# Patient Record
Sex: FEMALE | Race: WHITE | NOT HISPANIC OR LATINO | Employment: UNEMPLOYED | URBAN - METROPOLITAN AREA
[De-identification: names, ages, dates, MRNs, and addresses within clinical notes are randomized per-mention and may not be internally consistent; named-entity substitution may affect disease eponyms.]

---

## 2017-01-11 ENCOUNTER — COMMUNICATION - HEALTHEAST (OUTPATIENT)
Dept: FAMILY MEDICINE | Facility: CLINIC | Age: 37
End: 2017-01-11

## 2017-01-11 DIAGNOSIS — F41.9 ANXIETY: ICD-10-CM

## 2017-01-16 ENCOUNTER — COMMUNICATION - HEALTHEAST (OUTPATIENT)
Dept: TELEHEALTH | Facility: CLINIC | Age: 37
End: 2017-01-16

## 2017-01-27 ENCOUNTER — OFFICE VISIT - HEALTHEAST (OUTPATIENT)
Dept: FAMILY MEDICINE | Facility: CLINIC | Age: 37
End: 2017-01-27

## 2017-01-27 DIAGNOSIS — Z01.818 PREOP EXAMINATION: ICD-10-CM

## 2017-01-27 ASSESSMENT — MIFFLIN-ST. JEOR: SCORE: 1447.44

## 2017-02-01 ENCOUNTER — COMMUNICATION - HEALTHEAST (OUTPATIENT)
Dept: FAMILY MEDICINE | Facility: CLINIC | Age: 37
End: 2017-02-01

## 2017-02-01 DIAGNOSIS — F33.41 RECURRENT MAJOR DEPRESSIVE DISORDER, IN PARTIAL REMISSION (H): ICD-10-CM

## 2017-02-06 ENCOUNTER — RECORDS - HEALTHEAST (OUTPATIENT)
Dept: ADMINISTRATIVE | Facility: OTHER | Age: 37
End: 2017-02-06

## 2017-03-07 ENCOUNTER — COMMUNICATION - HEALTHEAST (OUTPATIENT)
Dept: FAMILY MEDICINE | Facility: CLINIC | Age: 37
End: 2017-03-07

## 2017-03-07 DIAGNOSIS — F41.9 ANXIETY: ICD-10-CM

## 2017-03-16 ENCOUNTER — COMMUNICATION - HEALTHEAST (OUTPATIENT)
Dept: FAMILY MEDICINE | Facility: CLINIC | Age: 37
End: 2017-03-16

## 2017-03-16 DIAGNOSIS — F41.9 ANXIETY AND DEPRESSION: ICD-10-CM

## 2017-03-16 DIAGNOSIS — F32.A ANXIETY AND DEPRESSION: ICD-10-CM

## 2017-05-04 ENCOUNTER — COMMUNICATION - HEALTHEAST (OUTPATIENT)
Dept: FAMILY MEDICINE | Facility: CLINIC | Age: 37
End: 2017-05-04

## 2017-05-04 DIAGNOSIS — F41.9 ANXIETY: ICD-10-CM

## 2017-06-29 ENCOUNTER — COMMUNICATION - HEALTHEAST (OUTPATIENT)
Dept: FAMILY MEDICINE | Facility: CLINIC | Age: 37
End: 2017-06-29

## 2017-06-29 DIAGNOSIS — F41.9 ANXIETY: ICD-10-CM

## 2017-07-03 ENCOUNTER — AMBULATORY - HEALTHEAST (OUTPATIENT)
Dept: FAMILY MEDICINE | Facility: CLINIC | Age: 37
End: 2017-07-03

## 2017-07-03 DIAGNOSIS — F41.9 ANXIETY: ICD-10-CM

## 2017-09-18 ENCOUNTER — COMMUNICATION - HEALTHEAST (OUTPATIENT)
Dept: FAMILY MEDICINE | Facility: CLINIC | Age: 37
End: 2017-09-18

## 2017-09-18 DIAGNOSIS — Z30.41 ORAL CONTRACEPTIVE USE: ICD-10-CM

## 2017-09-19 ENCOUNTER — COMMUNICATION - HEALTHEAST (OUTPATIENT)
Dept: TELEHEALTH | Facility: CLINIC | Age: 37
End: 2017-09-19

## 2017-10-17 ENCOUNTER — OFFICE VISIT - HEALTHEAST (OUTPATIENT)
Dept: FAMILY MEDICINE | Facility: CLINIC | Age: 37
End: 2017-10-17

## 2017-10-17 DIAGNOSIS — F41.9 ANXIETY: ICD-10-CM

## 2017-10-17 DIAGNOSIS — Z00.00 HEALTHCARE MAINTENANCE: ICD-10-CM

## 2017-10-17 DIAGNOSIS — R61 SWEATING INCREASE: ICD-10-CM

## 2017-11-02 ENCOUNTER — COMMUNICATION - HEALTHEAST (OUTPATIENT)
Dept: FAMILY MEDICINE | Facility: CLINIC | Age: 37
End: 2017-11-02

## 2017-11-02 DIAGNOSIS — F33.41 RECURRENT MAJOR DEPRESSIVE DISORDER, IN PARTIAL REMISSION (H): ICD-10-CM

## 2017-11-13 ENCOUNTER — OFFICE VISIT - HEALTHEAST (OUTPATIENT)
Dept: FAMILY MEDICINE | Facility: CLINIC | Age: 37
End: 2017-11-13

## 2017-11-13 DIAGNOSIS — J20.9 ACUTE BRONCHITIS: ICD-10-CM

## 2017-11-13 DIAGNOSIS — J45.901 ACUTE ASTHMA EXACERBATION: ICD-10-CM

## 2017-12-13 ENCOUNTER — COMMUNICATION - HEALTHEAST (OUTPATIENT)
Dept: FAMILY MEDICINE | Facility: CLINIC | Age: 37
End: 2017-12-13

## 2018-01-03 ENCOUNTER — OFFICE VISIT - HEALTHEAST (OUTPATIENT)
Dept: FAMILY MEDICINE | Facility: CLINIC | Age: 38
End: 2018-01-03

## 2018-01-03 ENCOUNTER — AMBULATORY - HEALTHEAST (OUTPATIENT)
Dept: NURSING | Facility: CLINIC | Age: 38
End: 2018-01-03

## 2018-01-03 DIAGNOSIS — R07.9 CHEST PAIN ON EXERTION: ICD-10-CM

## 2018-01-03 DIAGNOSIS — R07.9 CHEST PAIN: ICD-10-CM

## 2018-01-03 DIAGNOSIS — R09.1 PLEURISY: ICD-10-CM

## 2018-01-03 LAB
ATRIAL RATE - MUSE: 88 BPM
DIASTOLIC BLOOD PRESSURE - MUSE: NORMAL MMHG
INTERPRETATION ECG - MUSE: NORMAL
P AXIS - MUSE: 24 DEGREES
PR INTERVAL - MUSE: 134 MS
QRS DURATION - MUSE: 76 MS
QT - MUSE: 400 MS
QTC - MUSE: 484 MS
R AXIS - MUSE: 40 DEGREES
SYSTOLIC BLOOD PRESSURE - MUSE: NORMAL MMHG
T AXIS - MUSE: 35 DEGREES
VENTRICULAR RATE- MUSE: 88 BPM

## 2018-01-03 ASSESSMENT — MIFFLIN-ST. JEOR: SCORE: 1468.93

## 2018-01-09 ENCOUNTER — COMMUNICATION - HEALTHEAST (OUTPATIENT)
Dept: FAMILY MEDICINE | Facility: CLINIC | Age: 38
End: 2018-01-09

## 2018-01-09 DIAGNOSIS — Z30.41 ORAL CONTRACEPTIVE USE: ICD-10-CM

## 2018-01-15 ENCOUNTER — COMMUNICATION - HEALTHEAST (OUTPATIENT)
Dept: FAMILY MEDICINE | Facility: CLINIC | Age: 38
End: 2018-01-15

## 2018-01-15 DIAGNOSIS — F41.9 ANXIETY: ICD-10-CM

## 2018-01-18 ENCOUNTER — COMMUNICATION - HEALTHEAST (OUTPATIENT)
Dept: FAMILY MEDICINE | Facility: CLINIC | Age: 38
End: 2018-01-18

## 2018-01-18 DIAGNOSIS — F41.9 ANXIETY: ICD-10-CM

## 2018-01-30 ENCOUNTER — OFFICE VISIT - HEALTHEAST (OUTPATIENT)
Dept: FAMILY MEDICINE | Facility: CLINIC | Age: 38
End: 2018-01-30

## 2018-01-30 DIAGNOSIS — R22.1 LUMP IN NECK: ICD-10-CM

## 2018-01-30 DIAGNOSIS — R94.31 PROLONGED Q-T INTERVAL ON ECG: ICD-10-CM

## 2018-01-30 LAB
ANION GAP SERPL CALCULATED.3IONS-SCNC: 11 MMOL/L (ref 5–18)
BASOPHILS # BLD AUTO: 0.1 THOU/UL (ref 0–0.2)
BASOPHILS NFR BLD AUTO: 1 % (ref 0–2)
BUN SERPL-MCNC: 11 MG/DL (ref 8–22)
CALCIUM SERPL-MCNC: 9.6 MG/DL (ref 8.5–10.5)
CHLORIDE BLD-SCNC: 106 MMOL/L (ref 98–107)
CO2 SERPL-SCNC: 22 MMOL/L (ref 22–31)
CREAT SERPL-MCNC: 0.76 MG/DL (ref 0.6–1.1)
EOSINOPHIL # BLD AUTO: 0.3 THOU/UL (ref 0–0.4)
EOSINOPHIL NFR BLD AUTO: 3 % (ref 0–6)
ERYTHROCYTE [DISTWIDTH] IN BLOOD BY AUTOMATED COUNT: 11.4 % (ref 11–14.5)
GFR SERPL CREATININE-BSD FRML MDRD: >60 ML/MIN/1.73M2
GLUCOSE BLD-MCNC: 92 MG/DL (ref 70–125)
HCT VFR BLD AUTO: 42.7 % (ref 35–47)
HGB BLD-MCNC: 14.2 G/DL (ref 12–16)
LYMPHOCYTES # BLD AUTO: 1.9 THOU/UL (ref 0.8–4.4)
LYMPHOCYTES NFR BLD AUTO: 21 % (ref 20–40)
MCH RBC QN AUTO: 31.3 PG (ref 27–34)
MCHC RBC AUTO-ENTMCNC: 33.3 G/DL (ref 32–36)
MCV RBC AUTO: 94 FL (ref 80–100)
MONOCYTES # BLD AUTO: 0.5 THOU/UL (ref 0–0.9)
MONOCYTES NFR BLD AUTO: 5 % (ref 2–10)
NEUTROPHILS # BLD AUTO: 6.2 THOU/UL (ref 2–7.7)
NEUTROPHILS NFR BLD AUTO: 70 % (ref 50–70)
PLATELET # BLD AUTO: 320 THOU/UL (ref 140–440)
PMV BLD AUTO: 7.2 FL (ref 7–10)
POTASSIUM BLD-SCNC: 4.2 MMOL/L (ref 3.5–5)
RBC # BLD AUTO: 4.54 MILL/UL (ref 3.8–5.4)
SODIUM SERPL-SCNC: 139 MMOL/L (ref 136–145)
TSH SERPL DL<=0.005 MIU/L-ACNC: 1.43 UIU/ML (ref 0.3–5)
WBC: 8.9 THOU/UL (ref 4–11)

## 2018-02-02 ENCOUNTER — COMMUNICATION - HEALTHEAST (OUTPATIENT)
Dept: FAMILY MEDICINE | Facility: CLINIC | Age: 38
End: 2018-02-02

## 2018-02-02 ENCOUNTER — OFFICE VISIT - HEALTHEAST (OUTPATIENT)
Dept: CARDIOLOGY | Facility: CLINIC | Age: 38
End: 2018-02-02

## 2018-02-02 DIAGNOSIS — R94.31 ABNORMAL QT INTERVAL PRESENT ON ELECTROCARDIOGRAM: ICD-10-CM

## 2018-02-02 DIAGNOSIS — R94.31 PROLONGED Q-T INTERVAL ON ECG: ICD-10-CM

## 2018-02-02 LAB
ATRIAL RATE - MUSE: 85 BPM
DIASTOLIC BLOOD PRESSURE - MUSE: NORMAL MMHG
INTERPRETATION ECG - MUSE: NORMAL
P AXIS - MUSE: 16 DEGREES
PR INTERVAL - MUSE: 130 MS
QRS DURATION - MUSE: 82 MS
QT - MUSE: 384 MS
QTC - MUSE: 456 MS
R AXIS - MUSE: 34 DEGREES
SYSTOLIC BLOOD PRESSURE - MUSE: NORMAL MMHG
T AXIS - MUSE: 29 DEGREES
VENTRICULAR RATE- MUSE: 85 BPM

## 2018-02-02 ASSESSMENT — MIFFLIN-ST. JEOR: SCORE: 1484.4

## 2018-02-28 ENCOUNTER — COMMUNICATION - HEALTHEAST (OUTPATIENT)
Dept: FAMILY MEDICINE | Facility: CLINIC | Age: 38
End: 2018-02-28

## 2018-03-06 ENCOUNTER — COMMUNICATION - HEALTHEAST (OUTPATIENT)
Dept: FAMILY MEDICINE | Facility: CLINIC | Age: 38
End: 2018-03-06

## 2018-03-06 DIAGNOSIS — F41.9 ANXIETY: ICD-10-CM

## 2018-04-18 ENCOUNTER — OFFICE VISIT - HEALTHEAST (OUTPATIENT)
Dept: FAMILY MEDICINE | Facility: CLINIC | Age: 38
End: 2018-04-18

## 2018-04-18 DIAGNOSIS — N93.9 EXCESSIVE VAGINAL BLEEDING: ICD-10-CM

## 2018-05-11 ENCOUNTER — HOSPITAL ENCOUNTER (OUTPATIENT)
Dept: ULTRASOUND IMAGING | Facility: HOSPITAL | Age: 38
Discharge: HOME | End: 2018-05-11
Attending: FAMILY MEDICINE

## 2018-05-11 DIAGNOSIS — N93.9 EXCESSIVE VAGINAL BLEEDING: ICD-10-CM

## 2018-05-14 ENCOUNTER — COMMUNICATION - HEALTHEAST (OUTPATIENT)
Dept: FAMILY MEDICINE | Facility: CLINIC | Age: 38
End: 2018-05-14

## 2018-05-14 DIAGNOSIS — N92.0 HEAVY MENSES: ICD-10-CM

## 2018-05-30 ENCOUNTER — COMMUNICATION - HEALTHEAST (OUTPATIENT)
Dept: FAMILY MEDICINE | Facility: CLINIC | Age: 38
End: 2018-05-30

## 2018-05-30 DIAGNOSIS — F41.9 ANXIETY: ICD-10-CM

## 2018-06-01 ENCOUNTER — COMMUNICATION - HEALTHEAST (OUTPATIENT)
Dept: FAMILY MEDICINE | Facility: CLINIC | Age: 38
End: 2018-06-01

## 2018-06-01 DIAGNOSIS — F32.A ANXIETY AND DEPRESSION: ICD-10-CM

## 2018-06-01 DIAGNOSIS — F41.9 ANXIETY AND DEPRESSION: ICD-10-CM

## 2018-06-08 ENCOUNTER — RECORDS - HEALTHEAST (OUTPATIENT)
Dept: ADMINISTRATIVE | Facility: OTHER | Age: 38
End: 2018-06-08

## 2018-06-30 ENCOUNTER — COMMUNICATION - HEALTHEAST (OUTPATIENT)
Dept: FAMILY MEDICINE | Facility: CLINIC | Age: 38
End: 2018-06-30

## 2018-06-30 DIAGNOSIS — F43.23 ADJUSTMENT DISORDER WITH MIXED ANXIETY AND DEPRESSED MOOD: ICD-10-CM

## 2018-08-13 ENCOUNTER — AMBULATORY - HEALTHEAST (OUTPATIENT)
Dept: FAMILY MEDICINE | Facility: CLINIC | Age: 38
End: 2018-08-13

## 2018-08-13 ENCOUNTER — AMBULATORY - HEALTHEAST (OUTPATIENT)
Dept: NURSING | Facility: CLINIC | Age: 38
End: 2018-08-13

## 2018-08-13 DIAGNOSIS — J02.9 PHARYNGITIS: ICD-10-CM

## 2018-08-13 LAB — DEPRECATED S PYO AG THROAT QL EIA: NORMAL

## 2018-08-14 LAB — GROUP A STREP BY PCR: NORMAL

## 2018-08-25 ENCOUNTER — COMMUNICATION - HEALTHEAST (OUTPATIENT)
Dept: FAMILY MEDICINE | Facility: CLINIC | Age: 38
End: 2018-08-25

## 2018-08-25 DIAGNOSIS — F41.9 ANXIETY: ICD-10-CM

## 2018-09-06 ENCOUNTER — COMMUNICATION - HEALTHEAST (OUTPATIENT)
Dept: TELEHEALTH | Facility: CLINIC | Age: 38
End: 2018-09-06

## 2018-09-06 ENCOUNTER — COMMUNICATION - HEALTHEAST (OUTPATIENT)
Dept: HEALTH INFORMATION MANAGEMENT | Facility: CLINIC | Age: 38
End: 2018-09-06

## 2018-11-23 ENCOUNTER — COMMUNICATION - HEALTHEAST (OUTPATIENT)
Dept: FAMILY MEDICINE | Facility: CLINIC | Age: 38
End: 2018-11-23

## 2018-11-23 DIAGNOSIS — F41.9 ANXIETY: ICD-10-CM

## 2019-01-09 ENCOUNTER — COMMUNICATION - HEALTHEAST (OUTPATIENT)
Dept: FAMILY MEDICINE | Facility: CLINIC | Age: 39
End: 2019-01-09

## 2019-01-09 DIAGNOSIS — Z30.41 ORAL CONTRACEPTIVE USE: ICD-10-CM

## 2019-01-25 ENCOUNTER — OFFICE VISIT - HEALTHEAST (OUTPATIENT)
Dept: FAMILY MEDICINE | Facility: CLINIC | Age: 39
End: 2019-01-25

## 2019-01-25 DIAGNOSIS — J01.90 ACUTE SINUSITIS WITH SYMPTOMS GREATER THAN 10 DAYS: ICD-10-CM

## 2019-02-11 ENCOUNTER — AMBULATORY - HEALTHEAST (OUTPATIENT)
Dept: FAMILY MEDICINE | Facility: CLINIC | Age: 39
End: 2019-02-11

## 2019-02-11 ENCOUNTER — COMMUNICATION - HEALTHEAST (OUTPATIENT)
Dept: FAMILY MEDICINE | Facility: CLINIC | Age: 39
End: 2019-02-11

## 2019-02-11 DIAGNOSIS — R94.31 PROLONGED Q-T INTERVAL ON ECG: ICD-10-CM

## 2019-02-20 ENCOUNTER — COMMUNICATION - HEALTHEAST (OUTPATIENT)
Dept: FAMILY MEDICINE | Facility: CLINIC | Age: 39
End: 2019-02-20

## 2019-02-23 ENCOUNTER — COMMUNICATION - HEALTHEAST (OUTPATIENT)
Dept: FAMILY MEDICINE | Facility: CLINIC | Age: 39
End: 2019-02-23

## 2019-02-23 DIAGNOSIS — F41.9 ANXIETY: ICD-10-CM

## 2019-03-11 ENCOUNTER — OFFICE VISIT - HEALTHEAST (OUTPATIENT)
Dept: FAMILY MEDICINE | Facility: CLINIC | Age: 39
End: 2019-03-11

## 2019-03-11 DIAGNOSIS — R03.0 ELEVATED BP WITHOUT DIAGNOSIS OF HYPERTENSION: ICD-10-CM

## 2019-03-11 DIAGNOSIS — N64.4 PAIN OF LEFT BREAST: ICD-10-CM

## 2019-03-11 DIAGNOSIS — F41.9 ANXIETY: ICD-10-CM

## 2019-03-11 DIAGNOSIS — J45.20 MILD INTERMITTENT ASTHMA WITHOUT COMPLICATION: ICD-10-CM

## 2019-03-11 DIAGNOSIS — Z13.220 SCREENING FOR CHOLESTEROL LEVEL: ICD-10-CM

## 2019-03-11 DIAGNOSIS — N64.59 INVERTED NIPPLE: ICD-10-CM

## 2019-03-11 DIAGNOSIS — R94.31 PROLONGED Q-T INTERVAL ON ECG: ICD-10-CM

## 2019-03-11 DIAGNOSIS — Z30.41 ORAL CONTRACEPTIVE USE: ICD-10-CM

## 2019-03-11 DIAGNOSIS — Z79.899 CONTROLLED SUBSTANCE AGREEMENT SIGNED: ICD-10-CM

## 2019-03-11 DIAGNOSIS — F33.0 MILD RECURRENT MAJOR DEPRESSION (H): ICD-10-CM

## 2019-03-11 LAB
AMPHETAMINES UR QL SCN: NORMAL
BARBITURATES UR QL: NORMAL
BENZODIAZ UR QL: NORMAL
CANNABINOIDS UR QL SCN: NORMAL
COCAINE UR QL: NORMAL
CREAT UR-MCNC: 72.2 MG/DL
METHADONE UR QL SCN: NORMAL
OPIATES UR QL SCN: NORMAL
OXYCODONE UR QL: NORMAL
PCP UR QL SCN: NORMAL

## 2019-03-29 ENCOUNTER — HOSPITAL ENCOUNTER (OUTPATIENT)
Dept: MAMMOGRAPHY | Facility: CLINIC | Age: 39
Discharge: HOME | End: 2019-03-29
Attending: FAMILY MEDICINE

## 2019-03-29 DIAGNOSIS — N64.59 INVERTED NIPPLE: ICD-10-CM

## 2019-03-29 DIAGNOSIS — N64.4 PAIN OF LEFT BREAST: ICD-10-CM

## 2019-05-21 ENCOUNTER — COMMUNICATION - HEALTHEAST (OUTPATIENT)
Dept: FAMILY MEDICINE | Facility: CLINIC | Age: 39
End: 2019-05-21

## 2019-05-21 ENCOUNTER — AMBULATORY - HEALTHEAST (OUTPATIENT)
Dept: LAB | Facility: CLINIC | Age: 39
End: 2019-05-21

## 2019-05-21 DIAGNOSIS — Z13.220 SCREENING FOR CHOLESTEROL LEVEL: ICD-10-CM

## 2019-05-21 DIAGNOSIS — F41.9 ANXIETY: ICD-10-CM

## 2019-05-21 DIAGNOSIS — Z30.41 ORAL CONTRACEPTIVE USE: ICD-10-CM

## 2019-05-21 DIAGNOSIS — R03.0 ELEVATED BP WITHOUT DIAGNOSIS OF HYPERTENSION: ICD-10-CM

## 2019-05-21 DIAGNOSIS — R94.31 PROLONGED Q-T INTERVAL ON ECG: ICD-10-CM

## 2019-05-21 LAB
ALBUMIN SERPL-MCNC: 3.9 G/DL (ref 3.5–5)
ALP SERPL-CCNC: 21 U/L (ref 45–120)
ALT SERPL W P-5'-P-CCNC: 15 U/L (ref 0–45)
ANION GAP SERPL CALCULATED.3IONS-SCNC: 10 MMOL/L (ref 5–18)
AST SERPL W P-5'-P-CCNC: 13 U/L (ref 0–40)
BILIRUB SERPL-MCNC: 0.4 MG/DL (ref 0–1)
BUN SERPL-MCNC: 11 MG/DL (ref 8–22)
CALCIUM SERPL-MCNC: 9.8 MG/DL (ref 8.5–10.5)
CHLORIDE BLD-SCNC: 107 MMOL/L (ref 98–107)
CHOLEST SERPL-MCNC: 184 MG/DL
CO2 SERPL-SCNC: 23 MMOL/L (ref 22–31)
CREAT SERPL-MCNC: 0.82 MG/DL (ref 0.6–1.1)
FASTING STATUS PATIENT QL REPORTED: YES
GFR SERPL CREATININE-BSD FRML MDRD: >60 ML/MIN/1.73M2
GLUCOSE BLD-MCNC: 86 MG/DL (ref 70–125)
HDLC SERPL-MCNC: 58 MG/DL
LDLC SERPL CALC-MCNC: 106 MG/DL
MAGNESIUM SERPL-MCNC: 2.1 MG/DL (ref 1.8–2.6)
POTASSIUM BLD-SCNC: 4.4 MMOL/L (ref 3.5–5)
PROT SERPL-MCNC: 7 G/DL (ref 6–8)
SODIUM SERPL-SCNC: 140 MMOL/L (ref 136–145)
TRIGL SERPL-MCNC: 101 MG/DL
TSH SERPL DL<=0.005 MIU/L-ACNC: 1.44 UIU/ML (ref 0.3–5)

## 2019-05-23 ENCOUNTER — COMMUNICATION - HEALTHEAST (OUTPATIENT)
Dept: FAMILY MEDICINE | Facility: CLINIC | Age: 39
End: 2019-05-23

## 2019-06-01 ENCOUNTER — COMMUNICATION - HEALTHEAST (OUTPATIENT)
Dept: FAMILY MEDICINE | Facility: CLINIC | Age: 39
End: 2019-06-01

## 2019-06-01 DIAGNOSIS — F41.9 ANXIETY: ICD-10-CM

## 2019-06-06 ENCOUNTER — COMMUNICATION - HEALTHEAST (OUTPATIENT)
Dept: FAMILY MEDICINE | Facility: CLINIC | Age: 39
End: 2019-06-06

## 2019-06-12 ENCOUNTER — COMMUNICATION - HEALTHEAST (OUTPATIENT)
Dept: FAMILY MEDICINE | Facility: CLINIC | Age: 39
End: 2019-06-12

## 2019-06-13 ENCOUNTER — COMMUNICATION - HEALTHEAST (OUTPATIENT)
Dept: SCHEDULING | Facility: CLINIC | Age: 39
End: 2019-06-13

## 2019-06-13 ENCOUNTER — OFFICE VISIT - HEALTHEAST (OUTPATIENT)
Dept: FAMILY MEDICINE | Facility: CLINIC | Age: 39
End: 2019-06-13

## 2019-06-13 DIAGNOSIS — F41.9 ANXIETY AND DEPRESSION: ICD-10-CM

## 2019-06-13 DIAGNOSIS — F32.A ANXIETY AND DEPRESSION: ICD-10-CM

## 2019-06-13 DIAGNOSIS — F41.9 ANXIETY: ICD-10-CM

## 2019-06-17 ENCOUNTER — COMMUNICATION - HEALTHEAST (OUTPATIENT)
Dept: FAMILY MEDICINE | Facility: CLINIC | Age: 39
End: 2019-06-17

## 2019-06-19 ENCOUNTER — COMMUNICATION - HEALTHEAST (OUTPATIENT)
Dept: FAMILY MEDICINE | Facility: CLINIC | Age: 39
End: 2019-06-19

## 2019-06-21 ENCOUNTER — COMMUNICATION - HEALTHEAST (OUTPATIENT)
Dept: FAMILY MEDICINE | Facility: CLINIC | Age: 39
End: 2019-06-21

## 2019-06-21 ENCOUNTER — OFFICE VISIT - HEALTHEAST (OUTPATIENT)
Dept: FAMILY MEDICINE | Facility: CLINIC | Age: 39
End: 2019-06-21

## 2019-06-21 DIAGNOSIS — F41.9 ANXIETY: ICD-10-CM

## 2019-07-08 ENCOUNTER — COMMUNICATION - HEALTHEAST (OUTPATIENT)
Dept: FAMILY MEDICINE | Facility: CLINIC | Age: 39
End: 2019-07-08

## 2019-07-08 DIAGNOSIS — Z30.41 ORAL CONTRACEPTIVE USE: ICD-10-CM

## 2019-09-02 ENCOUNTER — COMMUNICATION - HEALTHEAST (OUTPATIENT)
Dept: FAMILY MEDICINE | Facility: CLINIC | Age: 39
End: 2019-09-02

## 2019-09-02 DIAGNOSIS — F32.A ANXIETY AND DEPRESSION: ICD-10-CM

## 2019-09-02 DIAGNOSIS — F41.9 ANXIETY AND DEPRESSION: ICD-10-CM

## 2019-09-30 ENCOUNTER — COMMUNICATION - HEALTHEAST (OUTPATIENT)
Dept: FAMILY MEDICINE | Facility: CLINIC | Age: 39
End: 2019-09-30

## 2019-09-30 DIAGNOSIS — Z30.41 ORAL CONTRACEPTIVE USE: ICD-10-CM

## 2019-10-11 ENCOUNTER — COMMUNICATION - HEALTHEAST (OUTPATIENT)
Dept: FAMILY MEDICINE | Facility: CLINIC | Age: 39
End: 2019-10-11

## 2019-10-11 DIAGNOSIS — Z30.41 ORAL CONTRACEPTIVE USE: ICD-10-CM

## 2019-10-28 ENCOUNTER — OFFICE VISIT - HEALTHEAST (OUTPATIENT)
Dept: FAMILY MEDICINE | Facility: CLINIC | Age: 39
End: 2019-10-28

## 2019-10-28 DIAGNOSIS — F41.1 ANXIETY STATE: ICD-10-CM

## 2019-10-28 DIAGNOSIS — F33.9 EPISODE OF RECURRENT MAJOR DEPRESSIVE DISORDER, UNSPECIFIED DEPRESSION EPISODE SEVERITY (H): ICD-10-CM

## 2019-10-28 DIAGNOSIS — Z79.899 CONTROLLED SUBSTANCE AGREEMENT SIGNED: ICD-10-CM

## 2019-10-28 DIAGNOSIS — N94.6 DYSMENORRHEA: ICD-10-CM

## 2019-10-28 DIAGNOSIS — B96.89 BV (BACTERIAL VAGINOSIS): ICD-10-CM

## 2019-10-28 DIAGNOSIS — N89.8 VAGINAL DISCHARGE: ICD-10-CM

## 2019-10-28 DIAGNOSIS — N76.0 BV (BACTERIAL VAGINOSIS): ICD-10-CM

## 2019-10-28 DIAGNOSIS — Z00.01 ENCOUNTER FOR GENERAL ADULT MEDICAL EXAMINATION WITH ABNORMAL FINDINGS: ICD-10-CM

## 2019-10-28 ASSESSMENT — ANXIETY QUESTIONNAIRES
5. BEING SO RESTLESS THAT IT IS HARD TO SIT STILL: SEVERAL DAYS
IF YOU CHECKED OFF ANY PROBLEMS ON THIS QUESTIONNAIRE, HOW DIFFICULT HAVE THESE PROBLEMS MADE IT FOR YOU TO DO YOUR WORK, TAKE CARE OF THINGS AT HOME, OR GET ALONG WITH OTHER PEOPLE: SOMEWHAT DIFFICULT
7. FEELING AFRAID AS IF SOMETHING AWFUL MIGHT HAPPEN: SEVERAL DAYS
6. BECOMING EASILY ANNOYED OR IRRITABLE: SEVERAL DAYS
3. WORRYING TOO MUCH ABOUT DIFFERENT THINGS: SEVERAL DAYS
2. NOT BEING ABLE TO STOP OR CONTROL WORRYING: SEVERAL DAYS
GAD7 TOTAL SCORE: 7
1. FEELING NERVOUS, ANXIOUS, OR ON EDGE: SEVERAL DAYS
4. TROUBLE RELAXING: SEVERAL DAYS

## 2019-10-28 ASSESSMENT — MIFFLIN-ST. JEOR: SCORE: 1464.34

## 2019-10-28 ASSESSMENT — PATIENT HEALTH QUESTIONNAIRE - PHQ9: SUM OF ALL RESPONSES TO PHQ QUESTIONS 1-9: 14

## 2019-10-29 LAB
CLUE CELLS: ABNORMAL
TRICHOMONAS, WET PREP: ABNORMAL
YEAST, WET PREP: ABNORMAL

## 2019-10-30 LAB
C TRACH DNA SPEC QL PROBE+SIG AMP: NEGATIVE
N GONORRHOEA DNA SPEC QL NAA+PROBE: NEGATIVE

## 2019-11-13 ENCOUNTER — RECORDS - HEALTHEAST (OUTPATIENT)
Dept: ADMINISTRATIVE | Facility: OTHER | Age: 39
End: 2019-11-13

## 2019-11-25 ENCOUNTER — COMMUNICATION - HEALTHEAST (OUTPATIENT)
Dept: FAMILY MEDICINE | Facility: CLINIC | Age: 39
End: 2019-11-25

## 2019-12-04 ENCOUNTER — OFFICE VISIT - HEALTHEAST (OUTPATIENT)
Dept: FAMILY MEDICINE | Facility: CLINIC | Age: 39
End: 2019-12-04

## 2019-12-04 DIAGNOSIS — N93.9 ABNORMAL UTERINE BLEEDING: ICD-10-CM

## 2019-12-04 DIAGNOSIS — Z01.818 PREOP GENERAL PHYSICAL EXAM: ICD-10-CM

## 2019-12-04 LAB — HCG UR QL: NEGATIVE

## 2019-12-04 ASSESSMENT — MIFFLIN-ST. JEOR: SCORE: 1454.47

## 2019-12-09 ASSESSMENT — MIFFLIN-ST. JEOR
SCORE: 1453.33
SCORE: 1453.33

## 2019-12-10 ENCOUNTER — ANESTHESIA - HEALTHEAST (OUTPATIENT)
Dept: SURGERY | Facility: AMBULATORY SURGERY CENTER | Age: 39
End: 2019-12-10

## 2019-12-11 ENCOUNTER — SURGERY - HEALTHEAST (OUTPATIENT)
Dept: SURGERY | Facility: AMBULATORY SURGERY CENTER | Age: 39
End: 2019-12-11

## 2019-12-11 ENCOUNTER — HOSPITAL ENCOUNTER (OUTPATIENT)
Dept: SURGERY | Facility: AMBULATORY SURGERY CENTER | Age: 39
Discharge: HOME | End: 2019-12-11
Attending: OBSTETRICS & GYNECOLOGY | Admitting: OBSTETRICS & GYNECOLOGY

## 2019-12-11 LAB
DIPSTICK EXPIRATION DATE - HISTORICAL: NORMAL
DIPSTICK LOT NUMBER - HISTORICAL: NORMAL
POC PREG URINE (HCG) HE - HISTORICAL: NEGATIVE
POC SPECIFIC GRAVITY, URINE - HISTORICAL: NORMAL
POCT KIT EXPIRATION DATE - HISTORICAL: NORMAL
POCT KIT LOT NUMBER HE - HISTORICAL: NORMAL
POCT NEGATIVE CONTROL HE - HISTORICAL: NORMAL
POCT POSITIVE CONTROL HE - HISTORICAL: NORMAL

## 2019-12-11 ASSESSMENT — MIFFLIN-ST. JEOR
SCORE: 1453.33
SCORE: 1453.33

## 2019-12-26 ENCOUNTER — COMMUNICATION - HEALTHEAST (OUTPATIENT)
Dept: FAMILY MEDICINE | Facility: CLINIC | Age: 39
End: 2019-12-26

## 2019-12-26 DIAGNOSIS — F33.9 EPISODE OF RECURRENT MAJOR DEPRESSIVE DISORDER, UNSPECIFIED DEPRESSION EPISODE SEVERITY (H): ICD-10-CM

## 2019-12-26 DIAGNOSIS — F41.1 ANXIETY STATE: ICD-10-CM

## 2020-03-02 ENCOUNTER — OFFICE VISIT - HEALTHEAST (OUTPATIENT)
Dept: FAMILY MEDICINE | Facility: CLINIC | Age: 40
End: 2020-03-02

## 2020-03-02 DIAGNOSIS — J45.21 MILD INTERMITTENT ASTHMA WITH EXACERBATION: ICD-10-CM

## 2020-03-02 DIAGNOSIS — R06.02 SHORTNESS OF BREATH: ICD-10-CM

## 2020-03-02 DIAGNOSIS — J45.901 ACUTE ASTHMA EXACERBATION: ICD-10-CM

## 2020-03-02 DIAGNOSIS — R05.9 COUGH: ICD-10-CM

## 2020-05-19 ENCOUNTER — COMMUNICATION - HEALTHEAST (OUTPATIENT)
Dept: FAMILY MEDICINE | Facility: CLINIC | Age: 40
End: 2020-05-19

## 2020-05-19 DIAGNOSIS — F41.1 ANXIETY STATE: ICD-10-CM

## 2020-05-21 RX ORDER — ATENOLOL 25 MG/1
25 TABLET ORAL DAILY
Qty: 90 TABLET | Refills: 0 | Status: SHIPPED | OUTPATIENT
Start: 2020-05-21 | End: 2021-12-04

## 2020-07-01 ENCOUNTER — OFFICE VISIT - HEALTHEAST (OUTPATIENT)
Dept: FAMILY MEDICINE | Facility: CLINIC | Age: 40
End: 2020-07-01

## 2020-07-01 DIAGNOSIS — Z20.822 SUSPECTED COVID-19 VIRUS INFECTION: ICD-10-CM

## 2020-07-01 ASSESSMENT — PATIENT HEALTH QUESTIONNAIRE - PHQ9: SUM OF ALL RESPONSES TO PHQ QUESTIONS 1-9: 0

## 2020-07-02 ENCOUNTER — AMBULATORY - HEALTHEAST (OUTPATIENT)
Dept: FAMILY MEDICINE | Facility: CLINIC | Age: 40
End: 2020-07-02

## 2020-07-02 DIAGNOSIS — Z20.822 SUSPECTED COVID-19 VIRUS INFECTION: ICD-10-CM

## 2020-07-04 ENCOUNTER — COMMUNICATION - HEALTHEAST (OUTPATIENT)
Dept: FAMILY MEDICINE | Facility: CLINIC | Age: 40
End: 2020-07-04

## 2020-11-06 ENCOUNTER — OFFICE VISIT - HEALTHEAST (OUTPATIENT)
Dept: FAMILY MEDICINE | Facility: CLINIC | Age: 40
End: 2020-11-06

## 2020-11-06 DIAGNOSIS — J06.9 UPPER RESPIRATORY TRACT INFECTION, UNSPECIFIED TYPE: ICD-10-CM

## 2020-11-06 DIAGNOSIS — J02.9 SORE THROAT: ICD-10-CM

## 2020-11-10 ENCOUNTER — OFFICE VISIT - HEALTHEAST (OUTPATIENT)
Dept: FAMILY MEDICINE | Facility: CLINIC | Age: 40
End: 2020-11-10

## 2020-11-10 DIAGNOSIS — J06.9 UPPER RESPIRATORY TRACT INFECTION, UNSPECIFIED TYPE: ICD-10-CM

## 2020-11-10 DIAGNOSIS — J02.9 SORE THROAT: ICD-10-CM

## 2020-11-10 LAB — DEPRECATED S PYO AG THROAT QL EIA: NORMAL

## 2020-11-11 LAB — GROUP A STREP BY PCR: NORMAL

## 2020-11-12 ENCOUNTER — COMMUNICATION - HEALTHEAST (OUTPATIENT)
Dept: SCHEDULING | Facility: CLINIC | Age: 40
End: 2020-11-12

## 2020-12-22 ENCOUNTER — RECORDS - HEALTHEAST (OUTPATIENT)
Dept: ADMINISTRATIVE | Facility: OTHER | Age: 40
End: 2020-12-22

## 2020-12-30 ENCOUNTER — AMBULATORY - HEALTHEAST (OUTPATIENT)
Dept: SURGERY | Facility: HOSPITAL | Age: 40
End: 2020-12-30

## 2020-12-30 DIAGNOSIS — Z11.59 ENCOUNTER FOR SCREENING FOR OTHER VIRAL DISEASES: ICD-10-CM

## 2021-01-14 ENCOUNTER — COMMUNICATION - HEALTHEAST (OUTPATIENT)
Dept: FAMILY MEDICINE | Facility: CLINIC | Age: 41
End: 2021-01-14

## 2021-01-14 DIAGNOSIS — F33.9 EPISODE OF RECURRENT MAJOR DEPRESSIVE DISORDER, UNSPECIFIED DEPRESSION EPISODE SEVERITY (H): ICD-10-CM

## 2021-01-14 DIAGNOSIS — F41.1 ANXIETY STATE: ICD-10-CM

## 2021-01-15 RX ORDER — ESCITALOPRAM OXALATE 10 MG/1
10 TABLET ORAL DAILY
Qty: 90 TABLET | Refills: 1 | Status: SHIPPED | OUTPATIENT
Start: 2021-01-15

## 2021-02-03 ENCOUNTER — OFFICE VISIT - HEALTHEAST (OUTPATIENT)
Dept: FAMILY MEDICINE | Facility: CLINIC | Age: 41
End: 2021-02-03

## 2021-02-03 DIAGNOSIS — J45.20 MILD INTERMITTENT ASTHMA WITHOUT COMPLICATION: ICD-10-CM

## 2021-02-03 DIAGNOSIS — Z01.818 PREOP GENERAL PHYSICAL EXAM: ICD-10-CM

## 2021-02-03 DIAGNOSIS — N92.0 EXCESSIVE OR FREQUENT MENSTRUATION: ICD-10-CM

## 2021-02-03 DIAGNOSIS — F41.9 ANXIETY: ICD-10-CM

## 2021-02-03 LAB
ERYTHROCYTE [DISTWIDTH] IN BLOOD BY AUTOMATED COUNT: 12.2 % (ref 11–14.5)
HCG UR QL: NEGATIVE
HCT VFR BLD AUTO: 43.8 % (ref 35–47)
HGB BLD-MCNC: 14.7 G/DL (ref 12–16)
MCH RBC QN AUTO: 30.6 PG (ref 27–34)
MCHC RBC AUTO-ENTMCNC: 33.6 G/DL (ref 32–36)
MCV RBC AUTO: 91 FL (ref 80–100)
PLATELET # BLD AUTO: 293 THOU/UL (ref 140–440)
PMV BLD AUTO: 9.5 FL (ref 7–10)
RBC # BLD AUTO: 4.81 MILL/UL (ref 3.8–5.4)
WBC: 7 THOU/UL (ref 4–11)

## 2021-02-03 RX ORDER — LORAZEPAM 0.5 MG/1
TABLET ORAL
Qty: 10 TABLET | Refills: 0 | Status: SHIPPED | OUTPATIENT
Start: 2021-02-03 | End: 2022-01-13

## 2021-02-03 ASSESSMENT — PATIENT HEALTH QUESTIONNAIRE - PHQ9: SUM OF ALL RESPONSES TO PHQ QUESTIONS 1-9: 4

## 2021-02-03 ASSESSMENT — MIFFLIN-ST. JEOR: SCORE: 1506.86

## 2021-02-08 ENCOUNTER — AMBULATORY - HEALTHEAST (OUTPATIENT)
Dept: FAMILY MEDICINE | Facility: CLINIC | Age: 41
End: 2021-02-08

## 2021-02-08 DIAGNOSIS — Z01.818 PREOP GENERAL PHYSICAL EXAM: ICD-10-CM

## 2021-02-08 LAB
SARS-COV-2 PCR COMMENT: NORMAL
SARS-COV-2 RNA SPEC QL NAA+PROBE: NEGATIVE
SARS-COV-2 VIRUS SPECIMEN SOURCE: NORMAL

## 2021-02-09 ENCOUNTER — COMMUNICATION - HEALTHEAST (OUTPATIENT)
Dept: SCHEDULING | Facility: CLINIC | Age: 41
End: 2021-02-09

## 2021-02-10 ENCOUNTER — ANESTHESIA - HEALTHEAST (OUTPATIENT)
Dept: SURGERY | Facility: HOSPITAL | Age: 41
End: 2021-02-10

## 2021-02-10 ENCOUNTER — SURGERY - HEALTHEAST (OUTPATIENT)
Dept: SURGERY | Facility: HOSPITAL | Age: 41
End: 2021-02-10

## 2021-03-15 ENCOUNTER — COMMUNICATION - HEALTHEAST (OUTPATIENT)
Dept: FAMILY MEDICINE | Facility: CLINIC | Age: 41
End: 2021-03-15

## 2021-03-15 DIAGNOSIS — F41.9 ANXIETY: ICD-10-CM

## 2021-03-15 RX ORDER — BUSPIRONE HYDROCHLORIDE 15 MG/1
30 TABLET ORAL DAILY
Qty: 180 TABLET | Refills: 1 | Status: SHIPPED | OUTPATIENT
Start: 2021-03-15

## 2021-05-26 ASSESSMENT — PATIENT HEALTH QUESTIONNAIRE - PHQ9: SUM OF ALL RESPONSES TO PHQ QUESTIONS 1-9: 14

## 2021-05-27 ASSESSMENT — PATIENT HEALTH QUESTIONNAIRE - PHQ9
SUM OF ALL RESPONSES TO PHQ QUESTIONS 1-9: 0
SUM OF ALL RESPONSES TO PHQ QUESTIONS 1-9: 4

## 2021-05-28 ENCOUNTER — RECORDS - HEALTHEAST (OUTPATIENT)
Dept: ADMINISTRATIVE | Facility: CLINIC | Age: 41
End: 2021-05-28

## 2021-05-28 ASSESSMENT — ANXIETY QUESTIONNAIRES: GAD7 TOTAL SCORE: 7

## 2021-05-28 ASSESSMENT — ASTHMA QUESTIONNAIRES
ACT_TOTALSCORE: 22
ACT_TOTALSCORE: 25
ACT_TOTALSCORE: 25

## 2021-05-29 NOTE — TELEPHONE ENCOUNTER
Pt. Was just seen in March and had talked about medication.  Currently is running low an a couple meds.  When she asks for them to be refilled it is not giving her the option even though it states can be refilled for the next year.   Prescription buspar and wellbutrion xl   [facility]- [facility] [place]   Please call pt.   Thank you.

## 2021-05-29 NOTE — TELEPHONE ENCOUNTER
Faxed all [facility] paperwork to [facility] at 12:07 pm to [phone].  MELANIA AaronA

## 2021-05-29 NOTE — PROGRESS NOTES
ASSESSMENT & PLAN:  1. Anxiety  Significant flare and anxiety for the last 2 weeks.  She is quite tearful today.  I believe her blood pressure and pulse are elevated due to anxiety.  She feels she cannot function at work due to her level of anxiety.  She has had to leave work yesterday and today.  Work is a major stressor currently.  She denies any suicidal ideation.  We discussed referral to psychiatry and psychology but she is hesitant, does not know how she can make this work with her much stress she already has and how overburdened and over-scheduled she already feels.  After discussion, we have decided on a plan to have her stay out of work for about the next week, through 6/21/2019, to focus on her health.  We will get her in with a counselor within the next week to establish care.  Referred also to psychiatry, although suspect this will take longer to get in.  In the meantime she was given an additional prescription of lorazepam, although this is outside of her controlled substance agreement, her lorazepam agreement was for her stable anxiety and currently having a significant anxiety flare.  She does not have a history of recurrently asking for early or extra prescriptions.  I have instructed her to change her BuSpar dosing from 15 mg once daily to 7.5 mg twice daily.  Increase Wellbutrin to 300 mg extended release daily.  Tentatively have her follow-up here in a week, but if this established with therapy and feeling better could follow-up through my chart.  She can also return to work sooner if she feels she is able.  She will get [place] paperwork to me.   - LORazepam (ATIVAN) 0.5 MG tablet; 1 tab up to twice daily for anxiety  Dispense: 10 tablet; Refill: 0  - Ambulatory referral to Psychology  - Ambulatory referral to Psychiatry  - buPROPion (WELLBUTRIN XL) 300 MG 24 hr tablet; Take 1 tablet (300 mg total) by mouth daily.  Dispense: 90 tablet; Refill: 0      Patient Instructions   Change your buspar dose to  HALF tab TWICE DAILY.    Increase wellbutrin to 2 tabs daily (300mg daily).     We will schedule you with psychology and psychiatry.       Orders Placed This Encounter   Procedures     Ambulatory referral to Psychology     Referral Priority:   Routine     Referral Type:   Behavioral Health     Referral Reason:   Evaluation and Treatment     Requested Specialty:   Psychology     Number of Visits Requested:   1     Ambulatory referral to Psychiatry     Referral Priority:   Routine     Referral Type:   Behavioral Health     Referral Reason:   Evaluation and Treatment     Requested Specialty:   Psychiatry     Number of Visits Requested:   1     Medications Discontinued During This Encounter   Medication Reason     albuterol (PROAIR HFA;PROVENTIL HFA;VENTOLIN HFA) 90 mcg/actuation inhaler      LORazepam (ATIVAN) 0.5 MG tablet Reorder     buPROPion (WELLBUTRIN XL) 150 MG 24 hr tablet Reorder       Return in about 1 week (around 6/20/2019).    CHIEF COMPLAINT:  Chief Complaint   Patient presents with     Medication Management     ativan refill       HISTORY OF PRESENT ILLNESS:  Russel is a 38 y.o. female presenting to the clinic today for anxiety.  Significant flaring anxiety symptoms over the last 2 weeks, the worst she has experienced in 10 years.  Had a falling out with her father 2 weeks ago has not spoken with him since.  The following day her  was in the emergency room with chest pain and there was concern for his heart, which was extremely stressful.  She has been doing the workload of 2 jobs at her current position since April, she constantly feels overwhelmed.  Very busy with kids and home life.  She had to leave work yesterday due to symptoms.  She went to the emergency room, that note reviewed, labs and EKG reviewed.  She was given Ativan and felt improved, vital signs improved.  She has not taken Ativan since it was given yesterday in the emergency room.  She has 2 tablets left at home.  She has been  compliant with her Wellbutrin 150 mg extended release daily and BuSpar 15 mg taking once daily.    REVIEW OF SYSTEMS:   All other systems are negative.    PFSH:  Patient Active Problem List   Diagnosis      Delivery     Allergic Rhinitis     Abnormal Pap Smear Of Cervix     Major Depression, Recurrent     Fatigue     Dysmenorrhea     Asthma     Wheezing (Symptom)     Menorrhagia     Anxiety     Nipple Retraction     Controlled substance agreement signed 3/2019 ativan (anxiety/panic) #10 tabs q 2 months        TOBACCO USE:  Social History     Tobacco Use   Smoking Status Former Smoker   Smokeless Tobacco Never Used   Tobacco Comment    quit in        VITALS:  Vitals:    19 0945 19 0947   BP: (!) 184/110 (!) 183/112   Patient Site: Left Arm Left Arm   Patient Position: Sitting Sitting   Cuff Size: Adult Large Adult Large   Pulse: (!) 113 (!) 113   Resp: 16    Temp: 98.5  F (36.9  C) 98.5  F (36.9  C)   TempSrc: Oral Oral   Weight: 183 lb 12.8 oz (83.4 kg)      Wt Readings from Last 3 Encounters:   19 183 lb 12.8 oz (83.4 kg)   19 180 lb (81.6 kg)   19 187 lb 6.4 oz (85 kg)     Body mass index is 35.9 kg/m .    PHYSICAL EXAM:  GENERAL: Pleasant, well-appearing patient in no acute distress.   HEENT: Pupils equal round. Sclerae and conjunctivae clear. Oropharynx with moist mucous membranes.   NEURO: Alert and oriented. Grossly nonfocal.   PSYCHIATRIC: Presents on time and well groomed. Normal speech and thought content. Full affect. No abnormal movements or behaviors noted.  Denies suicidal ideation.  CV: No lower extremity edema        MEDICATIONS:  Current Outpatient Medications   Medication Sig Dispense Refill     buPROPion (WELLBUTRIN XL) 300 MG 24 hr tablet Take 1 tablet (300 mg total) by mouth daily. 90 tablet 0     busPIRone (BUSPAR) 15 MG tablet Take 1 tablet (15 mg total) by mouth daily. 90 tablet 3     levonorgestrel-ethinyl estradiol (ALTAVERA, 28,) 0.15-0.03 mg per  tablet Take 1 tablet by mouth daily. 84 tablet 0     LORazepam (ATIVAN) 0.5 MG tablet 1 tab up to twice daily for anxiety 10 tablet 0     No current facility-administered medications for this visit.

## 2021-05-29 NOTE — PATIENT INSTRUCTIONS - HE
Check BP and pulse at home a few times a week and let us know if elevated (pulse >100, BP> 140/90)

## 2021-05-29 NOTE — TELEPHONE ENCOUNTER
Yes, patient should be seen.  We are not able to give more controlled substances without seeing the patient.  This is not a promise that she will be given more but she does need to be seen.  I did send the patient a my chart message also.  Thank you.

## 2021-05-29 NOTE — TELEPHONE ENCOUNTER
Please see request below and advise. Current CSA states 10 tablets every 2 months. Was last filled 5/22/19. Does pt need to be seen for further eval? See ED notes as well.

## 2021-05-29 NOTE — PROGRESS NOTES
ASSESSMENT & PLAN:  1.  Generalized anxiety disorder, current flare  Patient with severe flare of her long-standing anxiety and panic.  Symptoms were so significant recently that she was unable to focus and concentrate at work, had to leave 2 days in row due to severe anxiety and panic symptoms.  At that point, we took her off of work for 1 week and she returns today for follow-up.  She has met with the therapist once, does not have further appointments.  I have advised her to schedule a follow-up appointment with the therapist, ideally for ongoing care.  She has an appointment with psychiatry next week to review her medication management.  She is still having significant symptoms, and does not feel like she could return to work at this time.  We will have her stay out of work next week and then reassess.  She has her psychiatry appointment next week, and she will plan to schedule additional appointment with psychology.    Blood pressure still elevated but improved compared to last week.  Recommend she get a home blood pressure cuff and get used to monitoring on a regular basis and let me know if greater than 140/90.  Recommend she also check her pulse and let me know if it continues to be elevated.  Of note she had a normal EKG, troponin, d-dimer when she was in the ER last week with panic symptoms.  She states she is getting adequate fluids.  She does not consume caffeine on a regular basis and denies significant alcohol use or alcohol on a regular basis.    Patient Instructions   Check BP and pulse at home a few times a week and let us know if elevated (pulse >100, BP> 140/90)      No orders of the defined types were placed in this encounter.    There are no discontinued medications.    No follow-ups on file.    CHIEF COMPLAINT:  Chief Complaint   Patient presents with     Follow-up     medication changes on buspar and wellbutrin, anxiety       HISTORY OF PRESENT ILLNESS:  Rusesl is a 38 y.o. female presenting to  the clinic today to follow-up on anxiety.  Long history of anxiety, treated for years with medication.  Starting about 3 weeks ago she started experiencing the worst flare of symptoms she has had for probably about 10 years by her report.  She was seen in clinic about a week ago.  At that time, she had had to leave work 2 days in row due to severe symptoms of panic and anxiety.  She could not focus on work.  We discussed taking a week off of work, while referring to psychology, psychiatry, and returning here in a week.  We also increased her Wellbutrin from 150 mg extended release daily to 300 mg extended release daily, and change the dosing of her BuSpar to be more optimal.  She was taking 15 mg once daily and we changed it to 7.5 mg twice daily.  Since I saw her last, she did have one meeting with a therapist.  She does not yet have another appointment.  She was under the impression that she did not need to continue there.  She has an appointment with psychiatry next week.  She thinks she is feeling a little better compared to 1 week ago, but still having significant anxiety symptoms.  PHQ 9 and EMELYN 7 reviewed, significant symptoms on EMELYN 7.    Her blood pressure remains elevated but is improved.  Her heart rate is elevated.  She states that the automated blood pressure cough had to re-inflate, and it was uncomfortable.      When I saw her last, she had recently tried to go to [employer] for an errand and had to leave without buying anything she felt so panicky.  She has not been out shopping since then.  She is avoiding her children's sports games due to fear of having to talk to people about what is going on.  She continues to deny suicidal ideation or homicidal ideation.    REVIEW OF SYSTEMS:   All other systems are negative.    PFSH:  Patient Active Problem List   Diagnosis      Delivery     Allergic Rhinitis     Abnormal Pap Smear Of Cervix     Major Depression, Recurrent     Fatigue     Dysmenorrhea      Asthma     Wheezing (Symptom)     Menorrhagia     Anxiety     Nipple Retraction     Controlled substance agreement signed 3/2019 ativan (anxiety/panic) #10 tabs q 2 months        TOBACCO USE:  Social History     Tobacco Use   Smoking Status Former Smoker   Smokeless Tobacco Never Used   Tobacco Comment    quit in 2008       VITALS:  Vitals:    06/21/19 1350 06/21/19 1352   BP: (!) 147/100 (!) 143/102   Patient Site: Left Arm Left Arm   Patient Position: Sitting Sitting   Cuff Size: Adult Large Adult Large   Pulse: (!) 102 (!) 108   Resp: 16    Temp: 98.2  F (36.8  C) 98.2  F (36.8  C)   TempSrc: Oral Oral   Weight: 183 lb 9.6 oz (83.3 kg)      Wt Readings from Last 3 Encounters:   06/21/19 183 lb 9.6 oz (83.3 kg)   06/13/19 183 lb 12.8 oz (83.4 kg)   06/12/19 180 lb (81.6 kg)     Body mass index is 35.86 kg/m .    PHYSICAL EXAM:  GENERAL: Pleasant, well-appearing patient in no acute distress.   HEENT: Pupils equal round.   NEURO: Alert and oriented. Grossly nonfocal.   PSYCHIATRIC: Presents on time and well groomed. Normal speech and thought content. Somewhat restricted affect. No abnormal movements or behaviors noted. Denies SI.       MEDICATIONS:  Current Outpatient Medications   Medication Sig Dispense Refill     buPROPion (WELLBUTRIN XL) 300 MG 24 hr tablet Take 1 tablet (300 mg total) by mouth daily. 90 tablet 0     busPIRone (BUSPAR) 15 MG tablet Take 1 tablet (15 mg total) by mouth daily. 90 tablet 3     levonorgestrel-ethinyl estradiol (ALTAVERA, 28,) 0.15-0.03 mg per tablet Take 1 tablet by mouth daily. 84 tablet 0     LORazepam (ATIVAN) 0.5 MG tablet 1 tab up to twice daily for anxiety 10 tablet 0     No current facility-administered medications for this visit.

## 2021-05-29 NOTE — TELEPHONE ENCOUNTER
Refill Approved    Rx renewed per Medication Renewal Policy. Medication was last renewed on 3/11/19 .    Kendra Corona, [facility] Connection Triage/Med Refill 6/1/2019     Requested Prescriptions   Pending Prescriptions Disp Refills     busPIRone (BUSPAR) 15 MG tablet 90 tablet 1     Sig: Take 1 tablet (15 mg total) by mouth daily.       Tricyclics/Misc Antidepressant/Antianxiety Meds Refill Protocol Passed - 6/1/2019  4:26 PM        Passed - PCP or prescribing provider visit in last year     Last office visit with prescriber/PCP: 3/11/2019 Shaina Adams MD OR same dept: 3/11/2019 Shaina Adams MD OR same specialty: 3/11/2019 Shaina Adams MD  Last physical: Visit date not found Last MTM visit: Visit date not found   Next visit within 3 mo: Visit date not found  Next physical within 3 mo: Visit date not found  Prescriber OR PCP: Shaina Adams MD  Last diagnosis associated with med order: 1. Anxiety  - busPIRone (BUSPAR) 15 MG tablet; Take 1 tablet (15 mg total) by mouth daily.  Dispense: 90 tablet; Refill: 1    If protocol passes may refill for 12 months if within 3 months of last provider visit (or a total of 15 months).

## 2021-05-29 NOTE — TELEPHONE ENCOUNTER
Pharmacist called in ask if the Pt can get refill for Lorazepam today.  Inform the caller Pt has Rx sent today for refill.  The caller verbalized understand, no other concern at this time.      Ren Joyce RN, [facility] Connection Triage/Med Refill 6/13/2019 2:33 PM

## 2021-05-29 NOTE — TELEPHONE ENCOUNTER
[facility] [employer] [facility] [place] at 11:25 am, it was relayed by Pharmacist that both Rx's were ready for p/u. Pt notified at 11:28 am.   MELANIA AaronA

## 2021-05-29 NOTE — TELEPHONE ENCOUNTER
Name of form/paperwork: PAMELA  Have you been seen for this request: Yes:  on 06.13.19  Do we have the form: Yes- form was faxed into clinic, I put in out guide and placed in PURPLE team basket.  When is form needed by: 06.28.19  How would you like the form returned: FAX to [facility]  Fax Number: [phone]  Patient Notified form requests are processed in 3-5 business days: No  (If patient needs form sooner, please note that in this message.)  Okay to leave a detailed message? Yes

## 2021-05-29 NOTE — PATIENT INSTRUCTIONS - HE
Change your buspar dose to HALF tab TWICE DAILY.    Increase wellbutrin to 2 tabs daily (300mg daily).     We will schedule you with psychology and psychiatry.

## 2021-05-30 VITALS — WEIGHT: 188.7 LBS | HEIGHT: 60 IN | BODY MASS INDEX: 37.05 KG/M2

## 2021-05-30 NOTE — TELEPHONE ENCOUNTER
Sent my chart message to verify needed time off. Forms are with green team today as Katya Espinal NP is covering Dr Weeks.

## 2021-05-31 VITALS — BODY MASS INDEX: 37.79 KG/M2 | WEIGHT: 193.5 LBS

## 2021-05-31 VITALS — WEIGHT: 193.44 LBS | HEIGHT: 60 IN | BODY MASS INDEX: 37.98 KG/M2

## 2021-05-31 VITALS — WEIGHT: 193.5 LBS | BODY MASS INDEX: 37.79 KG/M2

## 2021-05-31 NOTE — TELEPHONE ENCOUNTER
Refill Approved    Rx renewed per Medication Renewal Policy. Medication was last renewed on 6/13/19.    Yolette Marroquin, [facility] Connection Triage/Med Refill 9/2/2019     Requested Prescriptions   Pending Prescriptions Disp Refills     buPROPion (WELLBUTRIN XL) 300 MG 24 hr tablet 90 tablet 0     Sig: Take 1 tablet (300 mg total) by mouth daily.       Tricyclics/Misc Antidepressant/Antianxiety Meds Refill Protocol Passed - 9/2/2019  1:54 PM        Passed - PCP or prescribing provider visit in last year     Last office visit with prescriber/PCP: 3/11/2019 Shaina Adams MD OR same dept: 6/21/2019 Sofya Weeks MD OR same specialty: 6/21/2019 Sofya Weeks MD  Last physical: Visit date not found Last MTM visit: Visit date not found   Next visit within 3 mo: Visit date not found  Next physical within 3 mo: Visit date not found  Prescriber OR PCP: Shaina Adams MD  Last diagnosis associated with med order: 1. Anxiety and depression  - buPROPion (WELLBUTRIN XL) 300 MG 24 hr tablet; Take 1 tablet (300 mg total) by mouth daily.  Dispense: 90 tablet; Refill: 0    If protocol passes may refill for 12 months if within 3 months of last provider visit (or a total of 15 months).

## 2021-06-01 ENCOUNTER — RECORDS - HEALTHEAST (OUTPATIENT)
Dept: ADMINISTRATIVE | Facility: CLINIC | Age: 41
End: 2021-06-01

## 2021-06-01 VITALS — WEIGHT: 192 LBS | BODY MASS INDEX: 37.5 KG/M2

## 2021-06-01 VITALS — BODY MASS INDEX: 35.42 KG/M2 | WEIGHT: 190.56 LBS

## 2021-06-01 VITALS — WEIGHT: 191.6 LBS | HEIGHT: 62 IN | BODY MASS INDEX: 35.26 KG/M2

## 2021-06-01 NOTE — TELEPHONE ENCOUNTER
Refill Approved    Rx renewed per Medication Renewal Policy. Medication was last renewed on 10/2019 physical scheduled.    Ariana Galvan, [facility] Connection Triage/Med Refill 9/30/2019     Requested Prescriptions   Pending Prescriptions Disp Refills     LILLOW, 28, 0.15-0.03 mg per tablet [Pharmacy Med Name: LILLOW-28 TABLET] 84 tablet 0     Sig: TAKE 1 TABLET BY MOUTH EVERY DAY       Oral Contraceptives Protocol Passed - 9/30/2019  1:57 AM        Passed - Visit with PCP or prescribing provider visit in last 12 months      Last office visit with prescriber/PCP: Visit date not found OR same dept: 6/21/2019 Sofya Weeks MD OR same specialty: 6/21/2019 Sofya Weeks MD  Last physical: Visit date not found Last MTM visit: Visit date not found   Next visit within 3 mo: Visit date not found  Next physical within 3 mo: Visit date not found  Prescriber OR PCP: TATIANA Borjas  Last diagnosis associated with med order: 1. Oral contraceptive use  - LILLOW, 28, 0.15-0.03 mg per tablet [Pharmacy Med Name: LILLOW-28 TABLET]; TAKE 1 TABLET BY MOUTH EVERY DAY  Dispense: 84 tablet; Refill: 0    If protocol passes may refill for 12 months if within 3 months of last provider visit (or a total of 15 months).

## 2021-06-02 ENCOUNTER — RECORDS - HEALTHEAST (OUTPATIENT)
Dept: ADMINISTRATIVE | Facility: CLINIC | Age: 41
End: 2021-06-02

## 2021-06-02 VITALS — WEIGHT: 187.4 LBS | BODY MASS INDEX: 34.84 KG/M2

## 2021-06-02 NOTE — PROGRESS NOTES
FEMALE ADULT PREVENTIVE EXAM      ASSESSMENT & PLAN  Russel was seen today for annual exam, menstrual problem and vaginitis.    Diagnoses and all orders for this visit:    Encounter for general adult medical examination with abnormal findings  Routine history and physical, updated in EMR. 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 Otherwise RTC in a year for next Annual physical.  -     Lipid Cascade; Future  -     Comprehensive Metabolic Panel; Future  -     Thyroid Cascade; Future    Episode of recurrent major depressive disorder, unspecified depression episode severity (H)  Anxiety  Controlled substance agreement signed 3/2019 ativan (anxiety/panic) #10 tabs q 2 months  -     Cancel: Drug Abuse 1+, Urine  Did not renew control substance today. She's getting Ativan from psych.     Dysmenorrhea  Doesn't want IUD. Would like to see GYN for ablation discussion. OCP doesn't help.   -     Ambulatory referral to Gynecology    Vaginal discharge  Exam consistent with BV. H/o treatment for BV about 3 months ago but came right back after sexual intercourse. In a monogamous relationship. Will treat with flagyl oral x 7 days then start on metrogel x 6 months. Discontinue after 6 months and see hopefully doesn't come back.  -     Wet Prep, Vaginal  -     Chlamydia trachomatis & Neisseria gonorrhoeae, Amplified Detection  -     metroNIDAZOLE (FLAGYL) 500 MG tablet; Take 1 tablet (500 mg total) by mouth 2 (two) times a day for 7 days.    BV (bacterial vaginosis)  -     metroNIDAZOLE (FLAGYL) 500 MG tablet; Take 1 tablet (500 mg total) by mouth 2 (two) times a day for 7 days.  -     metroNIDAZOLE (METROGEL) 0.75 % vaginal gel; Insert into the vagina 2 (two) times a week. Start after finished with oral metronidazole    Other orders  -     Influenza,Seasonal,Quad,INJ =/>6months  -     Td, Preservative Free (green label)      General  Immunizations reviewed and updated .      This note was created using Dragon  dictation software, spelling errors may occur.    Shaina Adams MD      CHIEF COMPLAINT:  Female preventive exam.    SUBJECTIVE:  Russel James is a 38 y.o. female who is here for annual physical and would like to discuss several concerns:    1. Vaginal discharge and odor - been going on for about 4 months, given Flagyl about a month on e-visit and it did go away when she finished course of Flagyl but then she had sex with her  and it came right back. Changes underwear two times a day. Showers daily. But symptoms don't seem to improve. Denies urine or bowel symptoms. No new sexual partner. Denies h/o STD. In a monogamous relationship with her . Denies abdominal pain.     2. Heavy menstrual period - on OCP but not better. Seems to bleed most days of the month. Has seen OB before and was told to try IUD next but she doesn't want IUD. Would like to see OB again to discuss ablation. She is done having chidlren.    3. Anxiety and depression - doing better. Seeing psych at [facility] and now on lexaro, buspar. Been given Ativan x60 tablets by psych but hardly using it. Apparently went to ED for mental breakdown in 6/2019. Due to relationship stresses. Stable now. No suicidal ideation. PHQ-9 is 14 and EMELYN-7 is 7 today.      She last saw me 9/2/2019.    She  has a past medical history of Depression, Inverted nipple, and QT prolongation.    Lab Results   Component Value Date    WBC 5.8 06/12/2019    HGB 13.7 06/12/2019    HCT 41.8 06/12/2019    MCV 91 06/12/2019     06/12/2019     06/12/2019    K 3.7 06/12/2019    BUN 10 06/12/2019     Lab Results   Component Value Date    CHOL 184 05/21/2019    HDL 58 05/21/2019    LDLCALC 106 05/21/2019    TRIG 101 05/21/2019     Lab Results   Component Value Date    TSH 1.44 05/21/2019     BP Readings from Last 3 Encounters:   10/28/19 (!) 137/95   06/21/19 (!) 143/102   06/13/19 (!) 167/111       Surgeries:    Past Surgical History:   Procedure  Laterality Date     RI  DELIVERY ONLY      Description:  Section;  Recorded: 2009;     RI DILATION/CURETTAGE,DIAGNOSTIC      Description: Dilation And Curettage;  Recorded: 2009;  Comments: elective AB     RI LIGATE FALLOPIAN TUBE      Description: Tubal Ligation;  Recorded: 2013;       Family History:  Her family history includes Heart murmur in her father; Heart murmur (age of onset: 35) in her sister.    Social History:  She  reports that she has quit smoking. She has never used smokeless tobacco.    Medications:    Current Outpatient Medications:      atenolol (TENORMIN) 25 MG tablet, Take 25 mg by mouth daily., Disp: , Rfl: 2     busPIRone (BUSPAR) 15 MG tablet, Take 1 tablet (15 mg total) by mouth daily. (Patient taking differently: Take 30 mg by mouth daily.    ), Disp: 90 tablet, Rfl: 3     escitalopram oxalate (LEXAPRO) 10 MG tablet, Take 10 mg by mouth daily., Disp: , Rfl:      levonorgestrel-ethinyl estradiol (LILLOW, 28,) 0.15-0.03 mg per tablet, Take 1 tablet by mouth daily., Disp: 84 tablet, Rfl: 0     buPROPion (WELLBUTRIN XL) 300 MG 24 hr tablet, Take 1 tablet (300 mg total) by mouth daily., Disp: 90 tablet, Rfl: 3     LORazepam (ATIVAN) 0.5 MG tablet, 1 tab up to twice daily for anxiety, Disp: 10 tablet, Rfl: 0  HELD MEDICATIONS: None.    Allergies:  No latex allergies.  Allergies   Allergen Reactions     Bactrim [Sulfamethoxazole-Trimethoprim]        RISK BEHAVIOR & HEALTH HABITS  History of abnormal pap smear: none.  Date of previous pap smear:  and normal with normal HPV.  Mammography: 2018 and normal for work up of inverted nipple, resume routine screen.  Self Breast Exam: yes.  Colon/Flex Sig: n/a.  DEXA: n/a.   Regular Exercise: no.  Balanced diet: yes.  Seat Belt Use: yes.  Calcium intake/Osteoporosis prevention: no.  Sun Screen: YES  Dental Care: YES    REVIEW OF SYSTEMS:  Complete head to toe review of systems is otherwise negative except as  "above.    OBJECTIVE:  BP (!) 137/95 (Patient Site: Left Arm, Patient Position: Sitting, Cuff Size: Adult Large)   Pulse 73   Resp 16   Ht 5' 0.24\" (1.53 m)   Wt 191 lb 9.6 oz (86.9 kg)   LMP 10/07/2019 (Approximate)   SpO2 97%   BMI 37.13 kg/m    Physical Exam:  General Appearance: Alert, cooperative, no distress, appears stated age  Head: Normocephalic, without obvious abnormality, atraumatic  Eyes: PERRL, conjunctiva/corneas clear, EOM's intact  Nose:no drainage  Throat: Lips, mucosa, and tongue normal; teeth and gums normal  Neck: Supple, symmetrical, trachea midline, no adenopathy;  thyroid: not enlarged, symmetric, no tenderness/mass/nodules.   Back: Symmetric, no curvature, ROM normal, no CVA tenderness  Lungs: Clear to auscultation bilaterally, respirations unlabored  Breasts: No breast masses, tenderness, asymmetry, or nipple discharge.  Heart: Regular rate and rhythm, S1 and S2 normal, no murmur, rub, or gallop, Abdomen: Soft, non-tender, bowel sounds active all four quadrants,  no masses, no organomegaly  Pelvic:Normally developed genitalia with no external lesions or eruptions. Vagina and cervix show no lesions, inflammation but brown vaginal discharge. No vaginal tenderness. No cystocele, No rectocele. Uterus normal.  No adnexal mass or tenderness.    Extremities: Extremities normal, atraumatic, no cyanosis or edema  Skin: Skin color, texture, turgor normal, no rashes or lesions  Lymph nodes: Cervical, supraclavicular, and axillary nodes normal  Neurologic: Normal        "

## 2021-06-03 VITALS — BODY MASS INDEX: 35.9 KG/M2 | WEIGHT: 183.8 LBS

## 2021-06-03 VITALS
OXYGEN SATURATION: 97 % | BODY MASS INDEX: 37.62 KG/M2 | RESPIRATION RATE: 16 BRPM | WEIGHT: 191.6 LBS | HEART RATE: 73 BPM | DIASTOLIC BLOOD PRESSURE: 95 MMHG | HEIGHT: 60 IN | SYSTOLIC BLOOD PRESSURE: 137 MMHG

## 2021-06-03 VITALS — BODY MASS INDEX: 35.86 KG/M2 | WEIGHT: 183.6 LBS

## 2021-06-04 VITALS
RESPIRATION RATE: 16 BRPM | DIASTOLIC BLOOD PRESSURE: 88 MMHG | SYSTOLIC BLOOD PRESSURE: 134 MMHG | HEIGHT: 60 IN | HEART RATE: 91 BPM | TEMPERATURE: 98.3 F | BODY MASS INDEX: 37.35 KG/M2 | WEIGHT: 190.25 LBS

## 2021-06-04 VITALS
OXYGEN SATURATION: 96 % | SYSTOLIC BLOOD PRESSURE: 141 MMHG | WEIGHT: 196 LBS | DIASTOLIC BLOOD PRESSURE: 94 MMHG | BODY MASS INDEX: 38.28 KG/M2 | HEART RATE: 100 BPM | TEMPERATURE: 98 F

## 2021-06-04 VITALS
WEIGHT: 190 LBS | HEIGHT: 60 IN | HEIGHT: 60 IN | BODY MASS INDEX: 37.3 KG/M2 | WEIGHT: 190 LBS | BODY MASS INDEX: 37.3 KG/M2

## 2021-06-04 NOTE — ANESTHESIA CARE TRANSFER NOTE
Last vitals:   Vitals:    12/11/19 1350   BP: 128/61   Pulse: 82   Resp: 20   Temp: 36.8  C (98.3  F)   SpO2: 99%     Patient's level of consciousness is drowsy  Spontaneous respirations: yes  Maintains airway independently: yes  Dentition unchanged: yes  Oropharynx: oropharynx clear of all foreign objects    QCDR Measures:  ASA# 20 - Surgical Safety Checklist: WHO surgical safety checklist completed prior to induction    PQRS# 430 - Adult PONV Prevention: 4558F - Pt received => 2 anti-emetic agents (different classes) preop & intraop  ASA# 8 - Peds PONV Prevention: NA - Not pediatric patient, not GA or 2 or more risk factors NOT present  PQRS# 424 - Suzi-op Temp Management: 4559F - At least one body temp DOCUMENTED => 35.5C or 95.9F within required timeframe  PQRS# 426 - PACU Transfer Protocol: - Transfer of care checklist used  ASA# 14 - Acute Post-op Pain: ASA14B - Patient did NOT experience pain >= 7 out of 10

## 2021-06-04 NOTE — INTERVAL H&P NOTE
I have performed an assessment and examined the patient, as necessary, to update the patient's current status that may have changed since the prior History and Physical.  The History & Physical has been reviewed and no updates are needed.    Angela Savage MD, FACOG, [facility]  12/11/2019 12:59 PM

## 2021-06-04 NOTE — PROGRESS NOTES
Preoperative Exam    Scheduled Procedure: Hysteroscopy  Surgery Date:  12/11/19  Surgery Location: [facility], fax [phone]    Surgeon:  Dr. Savage    Assessment/Plan:     1. Preop general physical exam  - Pregnancy (Beta-hCG, Qual), Urine    2. Abnormal uterine bleeding  Ablation scheduled    Surgical Procedure Risk: Low (reported cardiac risk generally < 1%)  Have you had prior anesthesia?: Yes  Have you or any family members had a previous anesthesia reaction:  Yes: pt gets very sick with N/V  Do you or any family members have a history of a clotting or bleeding disorder?: No  Cardiac Risk Assessment: no increased risk for major cardiac complications    APPROVAL GIVEN to proceed with proposed procedure, without further diagnostic evaluation    Please Note: gets sick nausea/vomiting with anesthesia    Functional Status: Independent  Patient plans to recover at home with family.     Subjective:      Russel James is a 38 y.o. female who presents for a preoperative consultation.  History abnromal uterine bleeding, excessive bleeding. Scheduled for ablation. Is on OCP and history of tubal ligation    All other systems reviewed and are negative, other than those listed in the HPI.    Pertinent History  Do you have difficulty breathing or chest pain after walking up a flight of stairs: No  History of obstructive sleep apnea: No  Steroid use in the last 6 months: No  Frequent Aspirin/NSAID use: No  Prior Blood Transfusion: No  Prior Blood Transfusion Reaction: No  If for some reason prior to, during or after the procedure, if it is medically indicated, would you be willing to have a blood transfusion?:  There is no transfusion refusal.    Tends to get sick with nausea/vomiting with anaesthesia.     Current Outpatient Medications   Medication Sig Dispense Refill     atenolol (TENORMIN) 25 MG tablet Take 25 mg by mouth daily.  2     busPIRone (BUSPAR) 15 MG tablet Take 1 tablet (15 mg total) by mouth  daily. (Patient taking differently: Take 30 mg by mouth daily.       ) 90 tablet 3     escitalopram oxalate (LEXAPRO) 10 MG tablet Take 10 mg by mouth daily.       levonorgestrel-ethinyl estradiol (LILLOW, 28,) 0.15-0.03 mg per tablet Take 1 tablet by mouth daily. 84 tablet 0     LORazepam (ATIVAN) 0.5 MG tablet 1 tab up to twice daily for anxiety 10 tablet 0     metroNIDAZOLE (METROGEL) 0.75 % vaginal gel Insert into the vagina 2 (two) times a week. Start after finished with oral metronidazole 70 g 6     No current facility-administered medications for this visit.         Allergies   Allergen Reactions     Bactrim [Sulfamethoxazole-Trimethoprim]        Patient Active Problem List   Diagnosis      delivery delivered     Allergic Rhinitis     Abnormal Pap Smear Of Cervix     Major Depression, Recurrent     Fatigue     Dysmenorrhea     Asthma     Wheezing (Symptom)     Menorrhagia     Anxiety     Nipple Retraction     Controlled substance agreement signed 3/2019 ativan (anxiety/panic) #10 tabs q 2 months       Past Medical History:   Diagnosis Date     Depression      Inverted nipple     since 7 years ago left breast      QT prolongation        Past Surgical History:   Procedure Laterality Date     VT  DELIVERY ONLY      Description:  Section;  Recorded: 2009;     VT DILATION/CURETTAGE,DIAGNOSTIC      Description: Dilation And Curettage;  Recorded: 2009;  Comments: elective AB     VT LIGATE FALLOPIAN TUBE      Description: Tubal Ligation;  Recorded: 2013;       Social History     Socioeconomic History     Marital status:      Spouse name: Not on file     Number of children: Not on file     Years of education: Not on file     Highest education level: Not on file   Occupational History     Not on file   Social Needs     Financial resource strain: Not on file     Food insecurity:     Worry: Not on file     Inability: Not on file     Transportation needs:     Medical: Not  on file     Non-medical: Not on file   Tobacco Use     Smoking status: Former Smoker     Smokeless tobacco: Never Used     Tobacco comment: quit in 2008   Substance and Sexual Activity     Alcohol use: Not on file     Drug use: Not on file     Sexual activity: Not on file   Lifestyle     Physical activity:     Days per week: Not on file     Minutes per session: Not on file     Stress: Not on file   Relationships     Social connections:     Talks on phone: Not on file     Gets together: Not on file     Attends Gnosticist service: Not on file     Active member of club or organization: Not on file     Attends meetings of clubs or organizations: Not on file     Relationship status: Not on file     Intimate partner violence:     Fear of current or ex partner: Not on file     Emotionally abused: Not on file     Physically abused: Not on file     Forced sexual activity: Not on file   Other Topics Concern     Not on file   Social History Narrative    . 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 Has 1 son and 1 daughter.        Patient Care Team:  Shaina Adams MD as PCP - General (Family Medicine)  Shaina Adams MD as Assigned PCP          Objective:     Vitals:    12/04/19 1300   BP: 134/88   Pulse: 91   Resp: 16   Temp: 98.3  F (36.8  C)   TempSrc: Oral   Weight: 190 lb 4 oz (86.3 kg)   Height: 5' (1.524 m)   LMP: 11/13/2019         Physical Exam:  General Appearance:  Alert, cooperative, no distress, appears stated age   Head:  Normocephalic, without obvious abnormality, atraumatic   Eyes:  PERRL, conjunctiva/corneas clear, EOM's intact   Ears:  Normal TM's and external ear canals, both ears   Nose: Nares normal, septum midline, mucosa normal, no drainage   Throat: Lips, mucosa, and tongue normal; teeth and gums normal   Neck: Supple, symmetrical, trachea midline, no adenopathy, thyroid: not enlarged, symmetric, no tenderness/mass/nodules   Back:   Symmetric, no curvature, ROM normal, no CVA  tenderness   Lungs:   Clear to auscultation bilaterally, respirations unlabored   Chest Wall:  No tenderness or deformity   Heart:  Regular rate and rhythm, S1, S2 normal, no murmur, rub or gallop   Abdomen:   Soft, non-tender, bowel sounds active all four quadrants,  no masses, no organomegaly   Extremities: Extremities normal, atraumatic, no cyanosis or edema   Skin: Skin color, texture, turgor normal, no rashes or lesions   Lymph nodes: Cervical and supraclavicular normal   Neurologic: Normal,Coordinated, smooth gait, balance, rapid alternating movements, sensory functioning, and cranial nerves II-XII grossly intact. DTR's+2 bilaterally brachioradialis, knee, ankle.              There are no Patient Instructions on file for this visit.    EKG:  n/a    Labs:  Labs pending at this time.  Results will be reviewed when available.    Immunization History   Administered Date(s) Administered     Hep A, historic 04/21/2009, 05/05/2010     Influenza F2e3-25, 11/19/2009     Influenza, inj, historic,unspecified 10/03/2017     Influenza, seasonal,quad inj 6-35 mos 11/12/2010, 10/11/2011     Influenza,seasonal,quad inj =/> 6months 10/28/2019     Td, adult adsorbed, PF 10/28/2019     Td,adult,historic,unspecified 08/24/2009     Tdap 08/24/2009           Electronically signed by Radha Mike CNP 12/04/19 12:58 PM

## 2021-06-04 NOTE — OP NOTE
Operative Note    Procedure date: 2019    Pre-procedure diagnosis: Menorrhagia, abnormal uterine bleeding, failed medical management  Post-procedure diagnosis: Same    Surgeon: Angela Savage MD    Procedure: Hysteroscopy, Nida endometrial ablation    EBL: 5 ml  Fluid deficit: 60 ml  UOP: None   Specimens: Endometrial curettings    Findings: Hematometria with ~50 mL of chocolate brown blood that expelled from the cervix after dilation.    Indications: Russel James is a 39 y.o.  who presented with abnormal uterine bleeding, failed medical management with several OCPs and declined Mirena IUD placement.  The risks, benefits and alternatives were discussed and the patient elected to proceed with the procedure.    Technique:   The patient was brought back to the OR. MAC anesthesia was administered and the patient was positioned in the dorsal lithotomy position with legs in Stephane stirrups. Patient was then prepped and draped in the usual fashion. Bimanual exam revealed the above findings. An open speculum was then placed.  The anterior lip was then grasped with a single tooth tenaculum. Cervical block with 10 cc of 1% lidocaine was injected into the 5 and 7 o clock position at the cervicovaginal junction. The uterus was then sounded to 10 cm. There cervical length was measured and noted to be 4 cm. The cervical os was dilated with Hegar dilators to 7 mm and 6.5 mm operative hysteroscope was inserted into the cervical os under direct visualization.  The above findings were noted.  The hysteroscope was removed. The Nida endometrial ablation system was then set to a cavity length of 6 cm. Cavity check was performed x3 without evidence of perforation. The ablation cycle then completed with a 002 error at 23 seconds. The first device was removed and another replaced with cavity check completed x3 and completion of the ablation cycle. The Nida was removed. The hysteroscope was passed into the cervix  again and adequate ablation noted. The hysteroscope and tenaculum were removed from the vagina. A sponge stick was applied to the cervix and pressure applied until the site of the tenaculum was hemostatic. Silver nitrate was applied. All instruments were then removed from the vagina and the procedure was complete.     Angela Savage MD

## 2021-06-04 NOTE — ANESTHESIA PREPROCEDURE EVALUATION
Anesthesia Evaluation      Patient summary reviewed   History of anesthetic complications (PONV)     Airway   Mallampati: II  Neck ROM: full   Pulmonary - normal exam   (+) asthma                           Cardiovascular   (+) hypertension, ,     Rhythm: regular  Rate: normal,         Neuro/Psych    (+) depression,     Endo/Other    (+) obesity,      GI/Hepatic/Renal       Other findings:  Eyelash extensions, will put 4x4 over top if necessary      Dental - normal exam                        Anesthesia Plan  Planned anesthetic: MAC  Zofran, decadron, scopolamine, propofol infusion  ASA 2   Induction: intravenous   Anesthetic plan and risks discussed with: patient  Anesthesia plan special considerations: antiemetics,   Post-op plan: routine recovery

## 2021-06-04 NOTE — ANESTHESIA POSTPROCEDURE EVALUATION
Patient: Russel James  HYSTEROSCOPY, KLAUDIA ENDOMETRIAL ABLATION  Anesthesia type: MAC    Patient location: Phase II Recovery  Last vitals:   Vitals Value Taken Time   /83 12/11/2019  2:01 PM   Temp 36.8  C (98.3  F) 12/11/2019  1:50 PM   Pulse 76 12/11/2019  2:11 PM   Resp 20 12/11/2019  1:50 PM   SpO2 94 % 12/11/2019  2:11 PM   Vitals shown include unvalidated device data.  Post vital signs: stable  Level of consciousness: awake and responds to simple questions  Post-anesthesia pain: pain controlled  Post-anesthesia nausea and vomiting: no  Pulmonary: unassisted, return to baseline  Cardiovascular: stable and blood pressure at baseline  Hydration: adequate  Anesthetic events: no    QCDR Measures:  ASA# 11 - Suzi-op Cardiac Arrest: ASA11B - Patient did NOT experience unanticipated cardiac arrest  ASA# 12 - Suzi-op Mortality Rate: ASA12B - Patient did NOT die  ASA# 13 - PACU Re-Intubation Rate: NA - No ETT / LMA used for case  ASA# 10 - Composite Anes Safety: ASA10A - No serious adverse event    Additional Notes:

## 2021-06-04 NOTE — H&P (VIEW-ONLY)
Preoperative Exam    Scheduled Procedure: Hysteroscopy  Surgery Date:  12/11/19  Surgery Location: [facility] [place], fax [phone]    Surgeon:  Dr. Savage    Assessment/Plan:     1. Preop general physical exam  - Pregnancy (Beta-hCG, Qual), Urine    2. Abnormal uterine bleeding  Ablation scheduled    Surgical Procedure Risk: Low (reported cardiac risk generally < 1%)  Have you had prior anesthesia?: Yes  Have you or any family members had a previous anesthesia reaction:  Yes: pt gets very sick with N/V  Do you or any family members have a history of a clotting or bleeding disorder?: No  Cardiac Risk Assessment: no increased risk for major cardiac complications    APPROVAL GIVEN to proceed with proposed procedure, without further diagnostic evaluation    Please Note: gets sick nausea/vomiting with anesthesia    Functional Status: Independent  Patient plans to recover at home with family.     Subjective:      Russel James is a 38 y.o. female who presents for a preoperative consultation.  History abnromal uterine bleeding, excessive bleeding. Scheduled for ablation. Is on OCP and history of tubal ligation    All other systems reviewed and are negative, other than those listed in the HPI.    Pertinent History  Do you have difficulty breathing or chest pain after walking up a flight of stairs: No  History of obstructive sleep apnea: No  Steroid use in the last 6 months: No  Frequent Aspirin/NSAID use: No  Prior Blood Transfusion: No  Prior Blood Transfusion Reaction: No  If for some reason prior to, during or after the procedure, if it is medically indicated, would you be willing to have a blood transfusion?:  There is no transfusion refusal.    Tends to get sick with nausea/vomiting with anaesthesia.     Current Outpatient Medications   Medication Sig Dispense Refill     atenolol (TENORMIN) 25 MG tablet Take 25 mg by mouth daily.  2     busPIRone (BUSPAR) 15 MG tablet Take 1 tablet (15 mg total) by mouth  daily. (Patient taking differently: Take 30 mg by mouth daily.       ) 90 tablet 3     escitalopram oxalate (LEXAPRO) 10 MG tablet Take 10 mg by mouth daily.       levonorgestrel-ethinyl estradiol (LILLOW, 28,) 0.15-0.03 mg per tablet Take 1 tablet by mouth daily. 84 tablet 0     LORazepam (ATIVAN) 0.5 MG tablet 1 tab up to twice daily for anxiety 10 tablet 0     metroNIDAZOLE (METROGEL) 0.75 % vaginal gel Insert into the vagina 2 (two) times a week. Start after finished with oral metronidazole 70 g 6     No current facility-administered medications for this visit.         Allergies   Allergen Reactions     Bactrim [Sulfamethoxazole-Trimethoprim]        Patient Active Problem List   Diagnosis      delivery delivered     Allergic Rhinitis     Abnormal Pap Smear Of Cervix     Major Depression, Recurrent     Fatigue     Dysmenorrhea     Asthma     Wheezing (Symptom)     Menorrhagia     Anxiety     Nipple Retraction     Controlled substance agreement signed 3/2019 ativan (anxiety/panic) #10 tabs q 2 months       Past Medical History:   Diagnosis Date     Depression      Inverted nipple     since 7 years ago left breast      QT prolongation        Past Surgical History:   Procedure Laterality Date     [place]  DELIVERY ONLY      Description:  Section;  Recorded: 2009;     [place] DILATION/CURETTAGE,DIAGNOSTIC      Description: Dilation And Curettage;  Recorded: 2009;  Comments: elective AB     MN LIGATE FALLOPIAN TUBE      Description: Tubal Ligation;  Recorded: 2013;       Social History     Socioeconomic History     Marital status:      Spouse name: Not on file     Number of children: Not on file     Years of education: Not on file     Highest education level: Not on file   Occupational History     Not on file   Social Needs     Financial resource strain: Not on file     Food insecurity:     Worry: Not on file     Inability: Not on file     Transportation needs:     Medical: Not  on file     Non-medical: Not on file   Tobacco Use     Smoking status: Former Smoker     Smokeless tobacco: Never Used     Tobacco comment: quit in 2008   Substance and Sexual Activity     Alcohol use: Not on file     Drug use: Not on file     Sexual activity: Not on file   Lifestyle     Physical activity:     Days per week: Not on file     Minutes per session: Not on file     Stress: Not on file   Relationships     Social connections:     Talks on phone: Not on file     Gets together: Not on file     Attends Alevism service: Not on file     Active member of club or organization: Not on file     Attends meetings of clubs or organizations: Not on file     Relationship status: Not on file     Intimate partner violence:     Fear of current or ex partner: Not on file     Emotionally abused: Not on file     Physically abused: Not on file     Forced sexual activity: Not on file   Other Topics Concern     Not on file   Social History Narrative    . 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 Has 1 son and 1 daughter.        Patient Care Team:  Shaina Adams MD as PCP - General (Family Medicine)  Shaina Adams MD as Assigned PCP          Objective:     Vitals:    12/04/19 1300   BP: 134/88   Pulse: 91   Resp: 16   Temp: 98.3  F (36.8  C)   TempSrc: Oral   Weight: 190 lb 4 oz (86.3 kg)   Height: 5' (1.524 m)   LMP: 11/13/2019         Physical Exam:  General Appearance:  Alert, cooperative, no distress, appears stated age   Head:  Normocephalic, without obvious abnormality, atraumatic   Eyes:  PERRL, conjunctiva/corneas clear, EOM's intact   Ears:  Normal TM's and external ear canals, both ears   Nose: Nares normal, septum midline, mucosa normal, no drainage   Throat: Lips, mucosa, and tongue normal; teeth and gums normal   Neck: Supple, symmetrical, trachea midline, no adenopathy, thyroid: not enlarged, symmetric, no tenderness/mass/nodules   Back:   Symmetric, no curvature, ROM normal, no CVA  tenderness   Lungs:   Clear to auscultation bilaterally, respirations unlabored   Chest Wall:  No tenderness or deformity   Heart:  Regular rate and rhythm, S1, S2 normal, no murmur, rub or gallop   Abdomen:   Soft, non-tender, bowel sounds active all four quadrants,  no masses, no organomegaly   Extremities: Extremities normal, atraumatic, no cyanosis or edema   Skin: Skin color, texture, turgor normal, no rashes or lesions   Lymph nodes: Cervical and supraclavicular normal   Neurologic: Normal,Coordinated, smooth gait, balance, rapid alternating movements, sensory functioning, and cranial nerves II-XII grossly intact. DTR's+2 bilaterally brachioradialis, knee, ankle.              There are no Patient Instructions on file for this visit.    EKG:  n/a    Labs:  Labs pending at this time.  Results will be reviewed when available.    Immunization History   Administered Date(s) Administered     Hep A, historic 04/21/2009, 05/05/2010     Influenza [id number], 11/19/2009     Influenza, inj, historic,unspecified 10/03/2017     Influenza, seasonal,quad inj 6-35 mos 11/12/2010, 10/11/2011     Influenza,seasonal,quad inj =/> 6months 10/28/2019     Td, adult adsorbed, PF 10/28/2019     Td,adult,historic,unspecified 08/24/2009     Tdap 08/24/2009           Electronically signed by Radha Mike CNP 12/04/19 12:58 PM

## 2021-06-05 VITALS
SYSTOLIC BLOOD PRESSURE: 137 MMHG | RESPIRATION RATE: 16 BRPM | BODY MASS INDEX: 39.62 KG/M2 | HEART RATE: 77 BPM | DIASTOLIC BLOOD PRESSURE: 77 MMHG | WEIGHT: 201.8 LBS | HEIGHT: 60 IN

## 2021-06-06 NOTE — PROGRESS NOTES
Assessment and Plan  1. Cough  - XR Chest 2 Views; Future  - XR Chest 2 Views    2. Shortness of breath  - XR Chest 2 Views; Future  - XR Chest 2 Views    3. Mild intermittent asthma with exacerbation  I hear some wheezing and O2 sat is down to 96 % despite recent use of albuterol.  Will treat for a mild-moderate asthma exacerbation.  Advised to continue to use albuterol as needed.  Return if no improvement or go to the ER if very shortof breath     - predniSONE (DELTASONE) 20 MG tablet; Take 20 mg by mouth 2 (two) times a day for 5 days.  Dispense: 10 tablet; Refill: 0    4. Acute asthma exacerbation  Refill provided for albuterol  - albuterol (PROAIR HFA;PROVENTIL HFA;VENTOLIN HFA) 90 mcg/actuation inhaler; Inhale 2 puffs every 4 (four) hours as needed for wheezing or shortness of breath.  Dispense: 1 Inhaler; Refill: 1    Also advised to monitor blood pressure and to follow up with PCP    Chief complaint:  Cough (x 1 week, OTC meds tried)    HPI:  Russel James is a 39 y.o. female who complains of 1 week of cough.  No fever.  NO sputum.  Does have shortness of breath and has been using her inhaler frequently (last use about 2 hours prior).  She had body aches about 4-5 days ago.  Son had influenza 2 weeks ago.  Usually her asthma is mild and intermittent and she does not usually need a controller medication.   No recent travel    PMH:   Patient Active Problem List   Diagnosis      delivery delivered     Allergic Rhinitis     Abnormal Pap Smear Of Cervix     Major Depression, Recurrent     Fatigue     Dysmenorrhea     Asthma     Wheezing (Symptom)     Menorrhagia     Anxiety     Nipple Retraction     Controlled substance agreement signed 3/2019 ativan (anxiety/panic) #10 tabs q 2 months       Past Medical History:   Diagnosis Date     Anxiety      Depression      Hypertension      Inverted nipple     since 7 years ago left breast      PONV (postoperative nausea and vomiting)      QT prolongation         Current Medications:   Current Outpatient Medications on File Prior to Visit   Medication Sig Dispense Refill     atenolol (TENORMIN) 25 MG tablet Take 25 mg by mouth daily.  2     busPIRone (BUSPAR) 15 MG tablet Take 1 tablet (15 mg total) by mouth daily. (Patient taking differently: Take 30 mg by mouth daily.       ) 90 tablet 3     escitalopram oxalate (LEXAPRO) 10 MG tablet Take 1 tablet (10 mg total) by mouth daily. 90 tablet 1     LORazepam (ATIVAN) 0.5 MG tablet 1 tab up to twice daily for anxiety 10 tablet 0     No current facility-administered medications on file prior to visit.        Allergies:  is allergic to bactrim [sulfamethoxazole-trimethoprim].    SH/FH:  Social History and Family History reviewed and updated.   Tobacco Status:  She  reports that she has quit smoking. She has never used smokeless tobacco.    Review of Systems:  A 10 point review of systems was done  No fever  No ear pain, no change in hearing  No rhinorrhea, no epistasis  No sore throat, no difficulty swallowing, no voice change   cough,  shortness of breath  No chest pain, no peripheral edema  No abdominal pain, no nausea or vomiting  No diarrhea or constipation  No dysuria  No joint pain      Vitals:    03/02/20 1131 03/02/20 1132   BP: (!) 148/100 (!) 141/94   Patient Site: Right Arm Right Arm   Patient Position: Sitting Sitting   Cuff Size: Adult Regular Adult Regular   Pulse: (!) 103 100   Temp: 98  F (36.7  C)    TempSrc: Oral    SpO2: 96%    Weight: 196 lb (88.9 kg)      Wt Readings from Last 3 Encounters:   03/02/20 196 lb (88.9 kg)   12/11/19 190 lb (86.2 kg)   12/04/19 190 lb 4 oz (86.3 kg)       Physical Exam:  GENERAL: Alert, cooperative, well-appearing, speaking comfortably in full sentences  EARS: TMs pearly, no bulging, redness, retraction. Hearing grossly normal.   NOSE: Nares patent, no discharge.  Normal nasal mucosa, septum and turbinates.  MOUTH: Pharynx moist, pink without exudate. No tonsillar  enlargement  NECK: No lymphadenopathy. Thyroid borders smooth without enlargement, nodules.   CV: Regular rate and rhythm without murmurs, rubs or gallops.  RESP: Lung sounds clear, symmetric excursion. No increased work of breathing.  Scattered wheeze more in the upper lung    CXR: no infiltrate

## 2021-06-08 NOTE — TELEPHONE ENCOUNTER
Spoke to Pt at 737 am, Pt informed writer that she used to get this medication for Dr Ambriz(Psychiatrist) at [facility] for elevated BP due to anxiety. States in order to get this refilled by him she would need to do therapy and believes she does not need it at this time. Pt continues to say that everytime she saw her OB-GYN her BP has been slightly elevated and believes she needs this medication. Offered appt with Dr Adams and she states that she is a [employer] employee at the [employer] and is covering the  by herself and is unable to make an appt at this time.  Pt has 4 days left of the medication and is asking that refill be sent to [facility] in [facility] in [place]. Will send message to PCP for approval/ denial  MELANIA AaronA

## 2021-06-08 NOTE — TELEPHONE ENCOUNTER
YONG for Pt at 1231 pm, upon return call please relay Dr Adams's message below and route back to [facility] [place]    MELANIA AaronA

## 2021-06-08 NOTE — PROGRESS NOTES
Preoperative Exam    Chief Complaint   Patient presents with     Pre-op Exam     [facility]--Basil Cell at [facility] 2017; Dr. Eldridge     Pre-op Exam     [facility]--Eye Lid Reconstruction 2017; Dr. Jessica Subramanian MD Fax #[phone]       HPI: Patient is a 36 y.o. female with basal cell carcinoma left eyelid who is coming to the clinic for preoperative evaluation in regards to the above diagnosis. Patient has discussed the risks, benefits, alternatives to the upcoming surgery with the surgeon.    No past medical history on file.  Past Surgical History   Procedure Laterality Date     Pr  delivery only       Description:  Section;  Recorded: 2009;     Pr dilation/curettage,diagnostic       Description: Dilation And Curettage;  Recorded: 2009;  Comments: elective AB     Pr ligate fallopian tube       Description: Tubal Ligation;  Recorded: 2013;     Social History     Social History     Marital status:      Spouse name: N/A     Number of children: N/A     Years of education: N/A     Occupational History     Not on file.     Social History Main Topics     Smoking status: Former Smoker     Smokeless tobacco: Not on file      Comment: quit in      Alcohol use Not on file     Drug use: Not on file     Sexual activity: Not on file     Other Topics Concern     Not on file     Social History Narrative         ROS:  10 point review of systems otherwise positive for:  none  Specifically patient denies any symptoms of recent cardiovascular issues, chest pain, chest pressure, shortness of breath, orthopnea, paroxysmal dyspnea, breathing concerns, or dramatic changes in exercise tolerance.  No current cough, fever, or URI symptoms.     No personal or family history of bleeding disorders or blood clots.  No personal or family history of anethesia reactions, except patient has had significant nausea/vomiting with anesthesia in the past    Current Outpatient Prescriptions    Medication Sig Dispense Refill     buPROPion (WELLBUTRIN XL) 150 MG 24 hr tablet Take 1 tablet (150 mg total) by mouth daily. 30 tablet 11     FLUoxetine (PROZAC) 20 MG capsule TAKE 3 CAPSULES BY MOUTH ONE TIME DAILY. 90 capsule 5     levonorgestrel-ethinyl estradiol (EBONY) 0.15-0.03 mg per tablet TAKE ONE TABLET BY MOUTH ONE TIME DAILY 84 tablet 3     LORazepam (ATIVAN) 0.5 MG tablet 1 tab up to twice daily for anxiety 10 tablet 0     No current facility-administered medications for this visit.        Allergies   Allergen Reactions     Bactrim [Sulfamethoxazole-Trimethoprim]        EXAM:  Visit Vitals     /72 (Patient Site: Right Arm, Patient Position: Sitting, Cuff Size: Adult Large)     Pulse 74     Temp 97.8  F (36.6  C) (Oral)     Resp 20     Ht 5' (1.524 m)     Wt 188 lb 11.2 oz (85.6 kg)     LMP 01/16/2017 (Within Days)     BMI 36.85 kg/m2     Constitutional:   Alert, NAD   Eyes: pupils equal and reactive to light, extraocular movements intact    ENT:  Nose: clear.  Throat:  clear without evidence of acute infection  Neck:   Supple, No significant adenopathy; no evidence of carotid bruit or thrill bilaterally  Respiratory:  Clear without wheeze, ronchi or crackles; no increased work of breathing.  Cardiac:   Normal S1, S2.  Regular rate and rhythm without murmur  Gastrointestinal:   Abdomen soft,  nontender , no hepatosplenomegaly or mass palpable.  Musculoskeletal:  No significant deformity.  Neurologic: no evidence of weakness of the upper or lower extremities  Skin:  No evidence of rash or jaundice    DIAGNOSTICS:  Labs performed include:  none indicated  EKG demonstrates: not indicated    ASSESSMENT/ PLAN:  -Diagnosis of left eyelid BCC, planning to undergo MOHS and reconstruction; patient is cleared for surgery at this time.    -No evidence of signficant cardiovascular risk that would prevent upcoming surgery.      The patient was asked the following questions to assess whether he/she was at  increased cardiovascular risk for surgery; at this time the patient is able to meet the following demand without chest pain or shortness of breath:  do heavy work around the house such as scrubbing floors or lifting or moving heavy furniture (between 4 and 10 METs)    Surgical risk:  This surgery is considered low risk involoving Ambulatory surgery, Endoscopy, Superficial procedure, Cataract surgery, Breast surgery    Sofya Weeks MD  [facility]

## 2021-06-08 NOTE — TELEPHONE ENCOUNTER
Please call patient: I have not prescribed this medication to her. Please find out more information (who prescribed it to her, for what reason, can the person continue to give it to her?)

## 2021-06-08 NOTE — TELEPHONE ENCOUNTER
I am covering for Dr. Adams, it appears this was never prescribed by Dr. Adams. Recommend route to previous pre scriber or schedule video visit to discuss with Dr. Adams

## 2021-06-09 NOTE — PROGRESS NOTES
"Russel James is a 39 y.o. female who is being evaluated via a billable video visit.      The patient has been notified of following:     \"This video visit will be conducted via a call between you and your physician/provider. We have found that certain health care needs can be provided without the need for an in-person physical exam.  This service lets us provide the care you need with a video conversation.  If a prescription is necessary we can send it directly to your pharmacy.  If lab work is needed we can place an order for that and you can then stop by our lab to have the test done at a later time.    Video visits are billed at different rates depending on your insurance coverage. Please reach out to your insurance provider with any questions.    If during the course of the call the physician/provider feels a video visit is not appropriate, you will not be charged for this service.\"    Patient has given verbal consent to a Video visit? Yes  How would you like to obtain your AVS? AVS Preference: [email or web address].  Patient would like the video invitation sent by: Text to cell phone: [phone]  Will anyone else be joining your video visit? No      Video Start Time: 7:50    Additional provider notes:  Assessment/ Plan     1. Suspected COVID-19 virus infection  Russel has had 2 days of symptoms suspicious for covid including fever, body aches, sore throat, and headache.  She has mild intermittent asthma and has been having to use her inhaler.  She is not feeling overly short of breath at this point but will let me know if this is changing over the coming days.  She is also having a little bit of left lower quadrant abdominal pain that just started this morning.  Would recommend that she have COVID testing.  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  In the meantime she will isolate herself and continue with symptomatic cares.  - Symptomatic COVID-19 Virus (CORONAVIRUS) PCR; " Future      Subjective:       Russel James is a 39 y.o. female who presents for a virtual visit for possible COVID-19.  She states that symptoms started 2 days ago.  She initially just had a lot of body aches and just did not feel very well.  Then when she woke up yesterday morning she had a temp of 100.3.  This continued throughout the day.  She has had a headache and a sore throat.  She has cough but she does feel like her chest is tight.  She has mild intermittent asthma at baseline and has been using her albuterol inhaler.  She does not feel like she has severe shortness of breath.  This morning, she started with some left lower quadrant abdominal pain.  She is not having any nausea, vomiting, or diarrhea.  As far as exposures, she states the only place she has been other than work is [employer] [place], but always wearing a mask.  Her  had a coworker tested positive about 2 weeks ago.  He has having symptoms and that he has decreased sense of taste.  He said no other symptoms.    Relevant past medical, family, surgical, and social history reviewed with patient, unless noted in HPI, not pertinent for this visit.  Medications were discussed and reconciled.   Review of Systems   A 12 point comprehensive review of systems was negative except as noted.      Current Outpatient Medications   Medication Sig Dispense Refill     albuterol (PROAIR HFA;PROVENTIL HFA;VENTOLIN HFA) 90 mcg/actuation inhaler Inhale 2 puffs every 4 (four) hours as needed for wheezing or shortness of breath. 1 Inhaler 1     atenoloL (TENORMIN) 25 MG tablet Take 1 tablet (25 mg total) by mouth daily. 90 tablet 0     busPIRone (BUSPAR) 15 MG tablet Take 1 tablet (15 mg total) by mouth daily. (Patient taking differently: Take 30 mg by mouth daily.       ) 90 tablet 3     escitalopram oxalate (LEXAPRO) 10 MG tablet Take 1 tablet (10 mg total) by mouth daily. 90 tablet 1     LORazepam (ATIVAN) 0.5 MG tablet 1 tab up to twice daily for anxiety  10 tablet 0     No current facility-administered medications for this visit.        Objective:      There were no vitals taken for this visit.      General appearance: alert, appears stated age and cooperative, nontoxic-appearing  Head: Normocephalic, without obvious abnormality, atraumatic  Eyes: conjunctivae/corneas clear.   Lungs: Breathing appears nonlabored.  She can speak in full sentences without difficulty.           No results found for this or any previous visit (from the past 168 hour(s)).       This note has been dictated using voice recognition software. Any grammatical or context distortions are unintentional and inherent to the software      Video-Visit Details    Type of service:  Video Visit    Video End Time (time video stopped): 8:05 AM  Originating Location (pt. Location): Home    Distant Location (provider location):  [facility] FAMILY [facility]IC[facility]/OB     Platform used for Video Visit: Dominic Rivas MD

## 2021-06-09 NOTE — TELEPHONE ENCOUNTER
Coronavirus (COVID-19) Notification    Reason for call  Notify of POSITIVE  COVID-19 lab result, assess symptoms,  review [facility] recommendations    Lab Result   Lab test for 2019-nCoV rRt-PCR or SARS-COV-2 PCR  Oropharyngeal AND/OR nasopharyngeal swabs were POSITIVE for 2019-nCoV RNA [OR] SARS-COV-2 RNA (COVID-19) RNA     We have been unable to reach Patient by phone at this time to notify of their Positive COVID-19 result.  Left voicemail message requesting a call back between 8 am to 6:30 p.m. to [phone] [facility] for results.   (Weekends, this line is available from 10A to 6:30P)     POSITIVE COVID-19 Letter sent.    [Name]  DIDIER GaleN, RN

## 2021-06-09 NOTE — PROGRESS NOTES
Coronavirus (COVID-19) Notification     Patient has tested Positive test for COVID-19 virus   COVID-19 Positive followup letter sent   See telephone encounter

## 2021-06-12 NOTE — PROGRESS NOTES
"Russel James is a 39 y.o. female who is being evaluated via a billable video visit.      The patient has been notified of following:     \"This video visit will be conducted via a call between you and your physician/provider. We have found that certain health care needs can be provided without the need for an in-person physical exam.  This service lets us provide the care you need with a video conversation.  If a prescription is necessary we can send it directly to your pharmacy.  If lab work is needed we can place an order for that and you can then stop by our lab to have the test done at a later time.    Video visits are billed at different rates depending on your insurance coverage. Please reach out to your insurance provider with any questions.    If during the course of the call the physician/provider feels a video visit is not appropriate, you will not be charged for this service.\"    Patient has given verbal consent to a Video visit? Yes  How would you like to obtain your AVS? AVS Preference: MyChart.  If dropped by the video visit, the video invitation should be sent to: Text to cell phone: [phone]  Will anyone else be joining your video visit? No        Video Start Time: 10:10 AM    Video-Visit Details    Type of service:  Video Visit    Video End Time (time video stopped): 10:17 AM  Originating Location (pt. Location): Home    Distant Location (provider location):  [facility]     Platform used for Video Visit: Doximity    SUBJECTIVE:   Sore throat, headache last few days, last night felt really hot, temp 99.6.   Hx asthma, no shortness of breath.     Son, has 3 positive notifications close contact at school. No fever, but feels clammy, stomach doesn't feel good, not eating much. Mild coughing. No shortness of breath.     Russel was diagnosed with COVID earlier this spring.    OBJECTIVE:   Russel appears well, breathing normally, able to converse in full sentences. She is " in no acute distress.    ASSESSMENT & PLAN:     1. Sore throat  2. Upper respiratory tract infection, unspecified type  - Symptomatic COVID-19 Virus (CORONAVIRUS) PCR; Future  - Rapid Strep A Screen-Throat; Future    I suspect recurrent (new subsequent) COVID infection, source likely from son who was exposed at school and is also symptomatic.     Will proceed with testing. Will also rule out strep due to complaint of sore throat.    Orders placed. Discussed red flag symptoms to present for further evaluation (acute worsening shortness of breath). Will plan to proceed with 10 day isolation regardless of results. She will let me know if she needs a note for work.    Telma Villeda, DO

## 2021-06-13 NOTE — PROGRESS NOTES
Rapid strep negative.  We will be in touch with COVID results when they return.  Patient updated by [place].  Telma Villeda, DO

## 2021-06-13 NOTE — PROGRESS NOTES
ASSESSMENT & PLAN:    1. Anxiety  Continue wellbutrin XL 150mg daily and fluoxetine 60mg daily. Ativan prn (rare use). Max 10 tablets every 2 months. BMP for med monitoring.   - LORazepam (ATIVAN) 0.5 MG tablet; 1 tab up to twice daily for anxiety  Dispense: 10 tablet; Refill: 0  - Basic Metabolic Panel; Future    2. Cholesterol and blood sugar screening  -fasting glucose, future BMP  - Lipid Cascade; Future    3. Sweating increase  If thyroid lab is normal, suspect it is related to her medications.   - Thyroid Cascade; Future    4. Basal cell carcinoma, near left eye  Continues to follow with dermatology for skin checks    There are no Patient Instructions on file for this visit.    Orders Placed This Encounter   Procedures     Basic Metabolic Panel     Standing Status:   Future     Standing Expiration Date:   10/17/2018     Lipid Cascade     Standing Status:   Future     Standing Expiration Date:   10/17/2018     Order Specific Question:   Fasting is required?     Answer:   Yes     Thyroid Cascade     Standing Status:   Future     Standing Expiration Date:   10/17/2018     Medications Discontinued During This Encounter   Medication Reason     LORazepam (ATIVAN) 0.5 MG tablet Reorder       No Follow-up on file.    CHIEF COMPLAINT:  Chief Complaint   Patient presents with     Depression     follow up     Anxiety     follow up       HISTORY OF PRESENT ILLNESS:  Russel is a 36 y.o. female presenting to the clinic today for depression and anxiety.    Major Depression/Anxiety: She is taking 150 MG bupropion and 60 MG fluoxetine daily. She also has 0.5 MG lorazepam that she takes as needed which tends to not be very often at this point. The lorazepam is helpful for her to manage on the days when she experiences panic symptoms. Currently she feels that as though things are going well.     Nodular Basal Cell Carcinoma, eyelid: She is followed by dermatology. The area has healed well.     REVIEW OF SYSTEMS:   Intermittent  Asthma: Well controlled. She uses an inhaler as needed.     She has been feeling warm and sweating much more easily. At work she has a fan on to help her cool down. She is managing well on her current birth control. All other systems are negative.    PFSH:  She works at the [employer].     TOBACCO USE:  History   Smoking Status     Former Smoker   Smokeless Tobacco     Not on file     Comment: quit in 2008       VITALS:  Vitals:    10/17/17 1344   BP: 110/80   Patient Site: Left Arm   Patient Position: Sitting   Cuff Size: Adult Regular   Pulse: 80   Resp: 16   Temp: 98.6  F (37  C)   TempSrc: Oral   Weight: 193 lb 8 oz (87.8 kg)     Wt Readings from Last 3 Encounters:   10/17/17 193 lb 8 oz (87.8 kg)   01/27/17 188 lb 11.2 oz (85.6 kg)   10/31/16 192 lb 1.3 oz (87.1 kg)       PHYSICAL EXAM:  GENERAL: Pleasant, well-appearing patient in no acute distress.  HEENT: Pupils equal round reactive to light.  Sclerae and conjunctivae clear.  Oropharynx is clear with  moist mucous membranes.  NECK: Supple without lymphadenopathy, no carotid bruits   CARDIOVASCULAR: Heart regular rate and rhythm without murmur normal S1-S2   LUNGS: Clear to auscultation bilaterally, good air movement throughout   EXTREMITIES Warm and well-perfused without edema. Pedal pulses palpable and symmetric bilaterally  NEURO: Alert and oriented. Grossly nonfocal.  PSYCHIATRIC: Presents on time and well groomed.  Normal speech and thought content.  Full affect.  No abnormal movements or behaviors noted.     DATA REVIEWED:   ADDITIONAL HISTORY SUMMARIZED (2): Reviewed note from 10/31/16 regarding anxiety.   DECISION TO OBTAIN EXTRA INFORMATION (1): None.   RADIOLOGY TESTS SUMMARIZED OR ORDERED (1): None.  LABS REVIEWED OR ORDERED (1): Reviewed labs from 10/31/16. Ordered labs.   MEDICINE TESTS SUMMARIZED OR ORDERED (1): None.  INDEPENDENT REVIEW OF EKG OR X-RAY (2 each): None.     The visit lasted a total of 10 minutes face to face with the  patient. Over 50% of the time was spent counseling and educating the patient about anxiety and depression.    I, Ida Slade, am scribing for and in the presence of, Dr. Weeks.    I, Dr. Weeks, personally performed the services described in this documentation, as scribed by Ida Slade in my presence, and it is both accurate and complete.    MEDICATIONS:  Current Outpatient Prescriptions   Medication Sig Dispense Refill     buPROPion (WELLBUTRIN XL) 150 MG 24 hr tablet TAKE ONE TABLET BY MOUTH ONE TIME DAILY 30 tablet 11     FLUoxetine (PROZAC) 20 MG capsule TAKE 3 CAPSULES BY MOUTH ONE TIME DAILY. 90 capsule 5     LORazepam (ATIVAN) 0.5 MG tablet 1 tab up to twice daily for anxiety 10 tablet 0     MARLISSA 0.15-0.03 mg per tablet TAKE ONE TABLET BY MOUTH ONE TIME DAILY 84 tablet 0     No current facility-administered medications for this visit.        Total Data Points: 3

## 2021-06-14 NOTE — PROGRESS NOTES
Subjective: Russel James is a 36 y.o. year old female who presents with cough. Symptoms started 6 days ago. Cough is described as dry, harsh, nocturnal and paroxysmal.  Associated symptoms include vocal hoarseness, shortness of breath, wheezing, nighttime symptoms of shortness of breath wheezing coughing.  Has a history of asthma in which she uses albuterol only occasionally.  States her triggers include upper respiratory infections.  Denies fevers, ear pain, sore throat, sinus pain/pressure. Home treatment of symptoms includes inhalers: Albuterol.     ROS  General: No weight changes, fatigue, weaknesses, chills, fever, night sweats.  Ears: No hearing loss, tinnitus, vertigo (spinning), discharge, ear pain, ear pressure or fullness.   Nose/Sinuses: No rhinorrhea, nasal congestion, sneezing, itching, allergy, epistaxis, sinus pain/pressure.  Mouth/throat/neck: No bleeding gums, hoarseness, sore throat, swollen neck.   Resp: No shortness of breath, wheeze,hemoptysis, or pain with respiration. no history of pneumonia, bronchitis, emphysema, TB.  History of asthma treated with albuterol.     History   Smoking Status     Former Smoker   Smokeless Tobacco     Not on file     Comment: quit in        Patient Active Problem List   Diagnosis      Delivery     Allergic Rhinitis     Abnormal Pap Smear Of Cervix     Major Depression, Recurrent     Fatigue     Viral Infection     Acute Pharyngitis     Dysmenorrhea     Intermittent Asthma     Wheezing (Symptom)     Menorrhagia     Anxiety     Nipple Retraction     No past medical history on file.  Current Outpatient Prescriptions on File Prior to Visit   Medication Sig Dispense Refill     buPROPion (WELLBUTRIN XL) 150 MG 24 hr tablet TAKE ONE TABLET BY MOUTH ONE TIME DAILY 30 tablet 11     FLUoxetine (PROZAC) 20 MG capsule TAKE 3 CAPSULES BY MOUTH ONE TIME DAILY. 90 capsule 4     LORazepam (ATIVAN) 0.5 MG tablet 1 tab up to twice daily for anxiety 10 tablet 0      MARLISSA 0.15-0.03 mg per tablet TAKE ONE TABLET BY MOUTH ONE TIME DAILY 84 tablet 0     No current facility-administered medications on file prior to visit.      Allergies   Allergen Reactions     Bactrim [Sulfamethoxazole-Trimethoprim]        Objective: /84 (Patient Site: Left Arm, Patient Position: Sitting, Cuff Size: Adult Regular)  Pulse 100  Temp 98.8  F (37.1  C) (Oral)   Resp 24  Wt 193 lb 8 oz (87.8 kg)  LMP 10/15/2017 (Exact Date)  BMI 37.79 kg/m2  General: Patient appears to be in no acute distress.  Ears: No nodules, lesions, masses, discharge or tenderness in auricles or mastoid area. No cerumen in ear canals. Tympanic membranes pearly gray, normal bony landmarks and cone of light bilaterally, no bulges or perforations.   Nose: Inferior turbinate mucosa pink and moist, no polyps without discharge. Septum midline. Frontal and maxillary sinuses non-tender with palpitation.   Oropharynx: Buccal mucosa pink, moist, no lesions. Tongue midline, spongy, pink. Uvula midline. Pharynx with mild erythema, without exudates. Tonsils + 1.   Lymph:  Lymph nodes in neck are not palpable and are not tender.  Lungs:  Unlabored, clear breath sounds heard throughout lung fields.   Heart:  Regular rate and rhythm.     Assessment/Plan:  Russel was seen today for cough and wheezing.    Diagnoses and all orders for this visit:    Acute bronchitis  -     codeine-guaiFENesin (GUAIFENESIN AC)  mg/5 mL liquid; Take 5 mL by mouth 3 (three) times a day as needed for cough. At nighttime   Guaifenesin (Muscinex) 600 mg during the daytime.     Acute asthma exacerbation  -     albuterol (PROAIR HFA;PROVENTIL HFA;VENTOLIN HFA) 90 mcg/actuation inhaler; Inhale 2 puffs every 4 (four) hours as needed for wheezing or shortness of breath.  -     predniSONE (DELTASONE) 20 MG tablet; Take 2 tablets (40 mg total) by mouth daily for 7 days.  Rest, drink extra water, eat a healthy diet      Return in about 1 week (around  11/20/2017), or if symptoms worsen or fail to improve.      Radha Galeana RN, CNP

## 2021-06-14 NOTE — PROGRESS NOTES
[facility]  [address] [facility] [address]  Dept: [phone]  Dept Fax: [phone]  Primary Provider: Shaina Adams MD  Pre-op Performing Provider: RACHEL RIVERA    PREOPERATIVE EVALUATION:  Today's date: 2/3/2021    Russel James is a 40 y.o. female who presents for a preoperative evaluation.    Surgical Information:  Surgery/Procedure: Hysterectomy  Surgery Location: [facility]  Surgeon: Dr. JOVANA Savage  Surgery Date: 2/10/21  Time of Surgery: TBD  Where patient plans to recover: At home with family  Fax number for surgical facility: Note does not need to be faxed, will be available electronically in Epic.    Type of Anesthesia Anticipated: General    Subjective     HPI related to upcoming procedure: Russel presents for a preop physical for menorrhagia.  She has had a failed ablation and OCPs.  She continues to have heavy bleeding and has a history of anemia.  She has had prior surgeries before without any concerns for anesthesia.  She has asthma which is currently under good control with her current medication.  She does have some anxiety which is well controlled with her current medication.  She has no known coronary artery disease and denies chest pain or shortness of breath.  She is a never smoker.  Will place order for her Covid test today.    Preop Questions 2/3/2021   Have you ever had a heart attack or stroke? No   Have you ever had surgery on your heart or blood vessels, such as a stent placement, a coronary artery bypass, or surgery on an artery in your head, neck, heart, or legs? No   Do you have chest pain with activity? No   Do you have a history of  heart failure? No   Do you currently have a cold, bronchitis or symptoms of other infection? No   Do you have a cough, shortness of breath, or wheezing? No   Do you or anyone in your family have previous history of blood clots? No   Do you or does anyone in your family have a serious bleeding problem such as  prolonged bleeding following surgeries or cuts? No   Have you ever had problems with anemia or been told to take iron pills? YES - menorrhagia   Have you had any abnormal blood loss such as black, tarry or bloody stools, or abnormal vaginal bleeding? YES - vaginal bleeding   Have you ever had a blood transfusion? No   Are you willing to have a blood transfusion if it is medically needed before, during, or after your surgery? Yes   Have you or any of your relatives ever had problems with anesthesia? No   Do you have sleep apnea, excessive snoring or daytime drowsiness? No   Do you have any artifical heart valves or other implanted medical devices like a pacemaker, defibrillator, or continuous glucose monitor? No   Do you have artificial joints? No   Are you allergic to latex? No   Is there any chance that you may be pregnant? No     Health Care Directive:  Patient does not have a Health Care Directive or Living Will: Discussed advance care planning with patient; however, patient declined at this time.    Preoperative Review of :    reviewed - controlled substances reflected in medication list.    See problem list for active medical problems.  Problems all longstanding and stable, except as noted/documented.  See ROS for pertinent symptoms related to these conditions.      Review of Systems  CONSTITUTIONAL: NEGATIVE for fever, chills, change in weight  INTEGUMENTARY/SKIN: NEGATIVE for worrisome rashes, moles or lesions  EYES: NEGATIVE for vision changes or irritation  ENT/MOUTH: NEGATIVE for ear, mouth and throat problems  RESP: NEGATIVE for significant cough or SOB  BREAST: NEGATIVE for masses, tenderness or discharge  CV: NEGATIVE for chest pain, palpitations or peripheral edema  GI: NEGATIVE for nausea, abdominal pain, heartburn, or change in bowel habits  : NEGATIVE for frequency, dysuria, or hematuria  MUSCULOSKELETAL: NEGATIVE for significant arthralgias or myalgia  NEURO: NEGATIVE for weakness,  dizziness or paresthesias  ENDOCRINE: NEGATIVE for temperature intolerance, skin/hair changes  HEME: NEGATIVE for bleeding problems  PSYCHIATRIC: NEGATIVE for changes in mood or affect    Patient Active Problem List    Diagnosis Date Noted     Obesity (BMI 35.0-39.9) with comorbidity (H) 2020     Hypertensive disorder 10/27/2020     Controlled substance agreement signed 3/2019 ativan (anxiety/panic) #10 tabs q 2 months 2018     Nipple Retraction       delivery delivered      Allergic Rhinitis      Abnormal Pap Smear Of Cervix      Major Depression, Recurrent      Fatigue      Dysmenorrhea      Asthma      Wheezing (Symptom)      Menorrhagia      Anxiety      Past Medical History:   Diagnosis Date     Anxiety      Depression      Hypertension      Inverted nipple     since 7 years ago left breast      PONV (postoperative nausea and vomiting)      QT prolongation      Past Surgical History:   Procedure Laterality Date     NE  DELIVERY ONLY      Description:  Section;  Recorded: 2009;     NE DILATION/CURETTAGE,DIAGNOSTIC      Description: Dilation And Curettage;  Recorded: 2009;  Comments: elective AB     NE HYSTEROSCOPY,W/ENDOMETRIAL ABLATION N/A 2019    Procedure: HYSTEROSCOPY, KLAUDIA ENDOMETRIAL ABLATION;  Surgeon: Leonor Savage MD;  Location: [facility];  Service: Gynecology     NE LIGATE FALLOPIAN TUBE      Description: Tubal Ligation;  Recorded: 2013;     Current Outpatient Medications   Medication Sig Dispense Refill     atenoloL (TENORMIN) 25 MG tablet Take 1 tablet (25 mg total) by mouth daily. 90 tablet 0     busPIRone (BUSPAR) 15 MG tablet Take 1 tablet (15 mg total) by mouth daily. (Patient taking differently: Take 30 mg by mouth daily.       ) 90 tablet 3     escitalopram oxalate (LEXAPRO) 10 MG tablet Take 1 tablet (10 mg total) by mouth daily. 90 tablet 1     norethindrone (MICRONOR) 0.35 mg tablet Take 1 tablet by mouth daily.        LORazepam (ATIVAN) 0.5 MG tablet 1 tab up to twice daily for anxiety 10 tablet 0     No current facility-administered medications for this visit.        Allergies   Allergen Reactions     Bactrim [Sulfamethoxazole-Trimethoprim]        Social History     Tobacco Use     Smoking status: Former Smoker     Smokeless tobacco: Never Used     Tobacco comment: quit in 2008   Substance Use Topics     Alcohol use: Yes        Social History     Substance and Sexual Activity   Drug Use Never        Objective     /77   Pulse 77   Resp 16   Ht 5' (1.524 m)   Wt 201 lb 12.8 oz (91.5 kg)   LMP  (LMP Unknown)   BMI 39.41 kg/m    Physical Exam    GENERAL APPEARANCE: healthy, alert and no distress     EYES: EOMI, PERRL     HENT: ear canals and TM's normal and nose and mouth without ulcers or lesions     NECK: no adenopathy, no asymmetry, masses, or scars and thyroid normal to palpation     RESP: lungs clear to auscultation - no rales, rhonchi or wheezes     CV: regular rates and rhythm, normal S1 S2, no S3 or S4 and no murmur, click or rub     ABDOMEN:  soft, nontender, no HSM or masses and bowel sounds normal     MS: extremities normal- no gross deformities noted, no evidence of inflammation in joints, FROM in all extremities.     SKIN: no suspicious lesions or rashes     NEURO: Normal strength and tone, sensory exam grossly normal, mentation intact and speech normal     PSYCH: mentation appears normal. and affect normal/bright     LYMPHATICS: No cervical adenopathy    Recent Labs   Lab Test 02/03/21  1031 06/12/19  0942 05/21/19  0823   HGB 14.7 13.7  --     308  --    NA  --  142 140   K  --  3.7 4.4   CREATININE  --  0.86 0.82        PRE-OP Diagnostics:   Recent Results (from the past 24 hour(s))   [facility](CBC w/o Differential)    Collection Time: 02/03/21 10:31 AM   Result Value Ref Range    WBC 7.0 4.0 - 11.0 thou/uL    RBC 4.81 3.80 - 5.40 mill/uL    Hemoglobin 14.7 12.0 - 16.0 g/dL    Hematocrit 43.8 35.0 -  47.0 %    MCV 91 80 - 100 fL    MCH 30.6 27.0 - 34.0 pg    MCHC 33.6 32.0 - 36.0 g/dL    RDW 12.2 11.0 - 14.5 %    Platelets 293 140 - 440 thou/uL    MPV 9.5 7.0 - 10.0 fL   Pregnancy (Beta-hCG, Qual), Urine    Collection Time: 02/03/21 10:31 AM   Result Value Ref Range    Pregnancy Test, Urine Negative Negative     No EKG required, no history of coronary heart disease, significant arrhythmia, peripheral arterial disease or other structural heart disease.    REVISED CARDIAC RISK INDEX (RCRI)   The patient has the following serious cardiovascular risks for perioperative complications:   - No serious cardiac risks = 0 points    RCRI INTERPRETATION: 0 points: Class I (very low risk - 0.4% complication rate)         Assessment & Plan      The proposed surgical procedure is considered LOW risk.    1. Preop general physical exam  Russel is approved for surgery with general anesthesia.  Outpatient Covid test is ordered today.  She was instructed to hold all supplements and NSAIDs 1 week prior to procedure.  She can take all her prescription medications the night before surgery.  - HM2(CBC w/o Differential)  - Pregnancy (Beta-hCG, Qual), Urine  - Asymptomatic COVID-19 Virus (CORONAVIRUS) PCR; Future    2. Menorrhagia      3. Mild intermittent asthma without complication  Under good control with current medications.    4. Anxiety  Stable  - LORazepam (ATIVAN) 0.5 MG tablet; 1 tab up to twice daily for anxiety  Dispense: 10 tablet; Refill: 0     Risks and Recommendations:  The patient has the following additional risks and recommendations for perioperative complications:   - No identified additional risk factors other than previously addressed    Medication Instructions:  Patient is to take all scheduled medications on the day of surgery    RECOMMENDATION:  APPROVAL GIVEN to proceed with proposed procedure, without further diagnostic evaluation.    Signed Electronically by: Katya Rivas MD    Copy of this evaluation report is  provided to requesting physician.    Preop [facility] Preop Guidelines    Revised Cardiac Risk Index

## 2021-06-14 NOTE — TELEPHONE ENCOUNTER
Refill Approved    Rx renewed per Medication Renewal Policy. Medication was last renewed on 12/26/2019.    Judy Smiley, [facility] Connection Triage/Med Refill 1/15/2021     Requested Prescriptions   Pending Prescriptions Disp Refills     escitalopram oxalate (LEXAPRO) 10 MG tablet 90 tablet 3     Sig: Take 1 tablet (10 mg total) by mouth daily.       SSRI Refill Protocol  Passed - 1/14/2021 11:27 AM        Passed - PCP or prescribing provider visit in last year     Last office visit with prescriber/PCP: 3/11/2019 Shaina Adams MD OR same dept: Visit date not found OR same specialty: 6/21/2019 Sofya Weeks MD  Last physical: 10/28/2019 Last MTM visit: Visit date not found   Next visit within 3 mo: Visit date not found  Next physical within 3 mo: Visit date not found  Prescriber OR PCP: Shaina Adams MD  Last diagnosis associated with med order: 1. Episode of recurrent major depressive disorder, unspecified depression episode severity (H)  - escitalopram oxalate (LEXAPRO) 10 MG tablet; Take 1 tablet (10 mg total) by mouth daily.  Dispense: 90 tablet; Refill: 3    2. Anxiety  - escitalopram oxalate (LEXAPRO) 10 MG tablet; Take 1 tablet (10 mg total) by mouth daily.  Dispense: 90 tablet; Refill: 3    If protocol passes may refill for 12 months if within 3 months of last provider visit (or a total of 15 months).

## 2021-06-14 NOTE — PATIENT INSTRUCTIONS - HE

## 2021-06-14 NOTE — TELEPHONE ENCOUNTER
Requested Prescriptions     Pending Prescriptions Disp Refills     escitalopram oxalate (LEXAPRO) 10 MG tablet 90 tablet 3     Sig: Take 1 tablet (10 mg total) by mouth daily.

## 2021-06-15 NOTE — ANESTHESIA PREPROCEDURE EVALUATION
Anesthesia Evaluation      Patient summary reviewed   History of anesthetic complications (PONV)     Airway   Mallampati: II  Neck ROM: full   Pulmonary - normal exam                          Cardiovascular - normal exam  (+) hypertension, ,      Neuro/Psych    (+) depression, anxiety/panic attacks,     Endo/Other - negative ROS      GI/Hepatic/Renal - negative ROS           Dental - normal exam                        Anesthesia Plan  Planned anesthetic: general endotracheal    COVID neg 2/8  ASA 2   Induction: intravenous   Anesthetic plan and risks discussed with: patient    Post-op plan: routine recovery

## 2021-06-15 NOTE — PROGRESS NOTES
Assessment/Plan:    Russel James is a 37 y.o. female presenting for:    1.  Chest pain: I reviewed the EKG and find it to be overall unremarkable other than a very minimally elongated QT interval.  Given the patient's symptoms of pain with deep breath or with movement I think this is most likely pleuritis especially considering her recent bronchitis.  We did discuss doing a chest x-ray however patient is not having any further symptoms of cough or shortness of breath.  She will let me know if her symptoms change.  Otherwise we discussed symptomatic treatments with NSAID medications as well as heating pads.  She will keep me closely informed of her symptoms.      Medications Discontinued During This Encounter   Medication Reason     codeine-guaiFENesin (GUAIFENESIN AC)  mg/5 mL liquid Therapy completed           Chief Complaint:  Chief Complaint   Patient presents with     Follow-up     EKG results       Subjective:   Russel James  is a very pleasant 37-year-old female presenting to the clinic today with concerns over left-sided chest pain.  Patient states that this chest pain began when she awoke this morning.  She notes that it is worse with a deep breath and also with movements.  She initially describes this as chest pain with exertion but eventually become to the conclusion that it is more chest pain with deep and movement.  She has not been feeling short of breath other than she feels as though she cannot take deep breaths due to the pain.  The pain is on the left side.  Of note, the patient had a recent bronchitis which she finally recovered from about a week ago.  She is no longer coughing.  She has not had any further fevers.  No lower extremity swelling.  She has not noticed any lightheadedness or palpitations.    12 point review of systems completed and negative except for what has been described above    History   Smoking Status     Former Smoker   Smokeless Tobacco     Not on file     Comment:  quit in 2008       Current Outpatient Prescriptions   Medication Sig     albuterol (PROAIR HFA;PROVENTIL HFA;VENTOLIN HFA) 90 mcg/actuation inhaler Inhale 2 puffs every 4 (four) hours as needed for wheezing or shortness of breath.     buPROPion (WELLBUTRIN XL) 150 MG 24 hr tablet TAKE ONE TABLET BY MOUTH ONE TIME DAILY     FLUoxetine (PROZAC) 20 MG capsule TAKE 3 CAPSULES BY MOUTH ONE TIME DAILY.     LORazepam (ATIVAN) 0.5 MG tablet 1 tab up to twice daily for anxiety     MARLISSA 0.15-0.03 mg per tablet TAKE ONE TABLET BY MOUTH ONE TIME DAILY         Objective:  Vitals:    01/03/18 1118   BP: 122/68   Pulse: 76   Resp: 16   Temp: 97.9  F (36.6  C)   TempSrc: Oral   Weight: 193 lb 7 oz (87.7 kg)   Height: 5' (1.524 m)       Vital signs reviewed and stable  General: No acute distress  Psych: Appropriate affect  HEENT: moist mucous membranes, pupils equal, round, reactive to light and accomodation, posterior oropharynx clear of erythema or exudate, tympanic membranes are pearly grey bilaterally  Lymph: no cervical or supraclavicular lymphadenopathy  Cardiovascular: regular rate and rhythm with no murmur  Pulmonary: clear to auscultation bilaterally with no wheeze  Abdomen: soft, non tender, non distended with normo-active bowel sounds  Extremities: warm and well perfused with no edema  Skin: warm and dry with no rash         This note has been dictated and transcribed using voice recognition software.   Any errors in transcription are unintentional and inherent to the software.

## 2021-06-15 NOTE — ANESTHESIA CARE TRANSFER NOTE
Last vitals:   Vitals:    02/10/21 1308   BP: 121/67   Pulse: 82   Resp: 20   Temp:    SpO2: 96%     Patient's level of consciousness is drowsy  Spontaneous respirations: yes  Maintains airway independently: yes  Dentition unchanged: yes  Oropharynx: oropharynx clear of all foreign objects    QCDR Measures:  ASA# 20 - Surgical Safety Checklist: WHO surgical safety checklist completed prior to induction    PQRS# 430 - Adult PONV Prevention: 4558F - Pt received => 2 anti-emetic agents (different classes) preop & intraop  ASA# 8 - Peds PONV Prevention: NA - Not pediatric patient, not GA or 2 or more risk factors NOT present  PQRS# 424 - Suzi-op Temp Management: 4559F - At least one body temp DOCUMENTED => 35.5C or 95.9F within required timeframe  PQRS# 426 - PACU Transfer Protocol: - Transfer of care checklist used  ASA# 14 - Acute Post-op Pain: ASA14B - Patient did NOT experience pain >= 7 out of 10

## 2021-06-15 NOTE — ANESTHESIA PROCEDURE NOTES
Peripheral Block    Patient location during procedure: OR  Start time: 2/10/2021 11:04 AM  End time: 2/10/2021 11:06 AM  post-op analgesia per surgeon order as noted in medical record  Staffing:  Performing  Anesthesiologist: Loulou Buck MD  Preanesthetic Checklist  Completed: patient identified, site marked, risks, benefits, and alternatives discussed, timeout performed, consent obtained, airway assessed, oxygen available, suction available, emergency drugs available and hand hygiene performed  Peripheral Block  Block type: other, TAP  Prep: ChloraPrep  Patient position: supine  Patient monitoring: blood pressure, heart rate, continuous pulse oximetry and cardiac monitor  Laterality: bilateral, same technique used bilaterally  Injection technique: ultrasound guided    Ultrasound used to visualize needle placement in proximity to nerve being blocked: yes   US used to visualize anesthetic spread  Visualized anatomic structures normal  No Pathological Findings  Permanent ultrasound image captured for medical record  Sterile gel and probe cover used for ultrasound.  Needle  Needle type: Stimuplex   Needle gauge: 20G  Needle length: 4 in  no peripheral nerve catheter placed  Assessment  Injection assessment: no difficulty with injection, negative aspiration for heme, no paresthesia on injection and incremental injection

## 2021-06-15 NOTE — ANESTHESIA POSTPROCEDURE EVALUATION
Patient: Russel James  Procedure(s):  ROBOTIC LAPAROSCOPIC HYSTERECTOMY BILATERAL SALPINGECTOMY, LEFT LABIAL BIOPSY  CYSTOSCOPY  Anesthesia type: general    Patient location: PACU  Last vitals:   Vitals Value Taken Time   /65 02/10/21 1401   Temp 36.6  C (97.8  F) 02/10/21 1308   Pulse 79 02/10/21 1402   Resp 48 02/10/21 1402   SpO2 97 % 02/10/21 1402   Vitals shown include unvalidated device data.  Post vital signs: stable  Level of consciousness: awake and responds to simple questions  Post-anesthesia pain: pain controlled  Post-anesthesia nausea and vomiting: no  Pulmonary: unassisted, return to baseline  Cardiovascular: stable and blood pressure at baseline  Hydration: adequate  Anesthetic events: no    QCDR Measures:  ASA# 11 - Suzi-op Cardiac Arrest: ASA11B - Patient did NOT experience unanticipated cardiac arrest  ASA# 12 - Suzi-op Mortality Rate: ASA12B - Patient did NOT die  ASA# 13 - PACU Re-Intubation Rate: ASA13B - Patient did NOT require a new airway mgmt  ASA# 10 - Composite Anes Safety: ASA10A - No serious adverse event    Additional Notes:  
Spontaneous, unlabored and symmetrical

## 2021-06-15 NOTE — PROGRESS NOTES
ASSESSMENT & PLAN:  1. Lump in neck  Does not appear to be in the skin. Suspect inflamed lymph node, but with how tender she is, recommend treating with antibiotics to cover for potential infection. Keflex 500mg tid x 7 days. CBC unremarkable. If completely resolves within a couple of days, can complete antibiotics after 5 days. Ultrasound ordered to further evaluate, but if resolves in next couple of days, does not need to do ultrasound.   - US Neck Limited; Future  - HM1(CBC and Differential)  - HM1 (CBC with Diff)    2. Prolonged Q-T interval on ECG  Medications reviewed. Fluoxetine could be a cause. Recommend she discuss with cardiology for their recommendations. BMP to ensure normal potassium.   - Ambulatory referral to Cardiology  - Basic Metabolic Panel      Thyroid Cascade ordered in October as future lab will be drawn today as well      There are no Patient Instructions on file for this visit.    No orders of the defined types were placed in this encounter.    There are no discontinued medications.    No Follow-up on file.    CHIEF COMPLAINT:  Chief Complaint   Patient presents with     Mass     c/o lump on right side of the back of neck since Sunday       HISTORY OF PRESENT ILLNESS:  Russel is a 37 y.o. female presenting to the clinic today for a lump on the back of her neck. The lump is near her hairline on the right side of her neck. The lump is very tender to the touch. On Sunday, the pain kept her awake at night. She used a heat pack on it on Monday with some relief. She notes that she had bronchitis a few months ago. She denies any fevers, chills, or night sweats. She denies any recent exposure to animals or international travel. No sore throat. No personal history of MRSA.    REVIEW OF SYSTEMS:   Teeth are in good condition. EKG from 1/3/18 showed borderline prolonged QT. She agrees to be seen by cardiology regarding this. Her blood pressure is running high today. All other systems are  negative.    PFSH:  Family: Father and sister (late 30s) have heart murmur.     TOBACCO USE:  History   Smoking Status     Former Smoker   Smokeless Tobacco     Never Used     Comment: quit in 2008       VITALS:  Vitals:    01/30/18 1418   BP: 134/84   Patient Site: Right Arm   Patient Position: Sitting   Cuff Size: Adult Regular   Pulse: 68   Resp: 16   Temp: 98.2  F (36.8  C)   TempSrc: Oral   Weight: 192 lb (87.1 kg)     Wt Readings from Last 3 Encounters:   01/30/18 192 lb (87.1 kg)   01/03/18 193 lb 7 oz (87.7 kg)   11/13/17 193 lb 8 oz (87.8 kg)     Body mass index is 37.5 kg/(m^2).    PHYSICAL EXAM:  GENERAL: Pleasant, well-appearing patient in no acute distress.   HEENT: Pupils equal round reactive to light. Sclerae and conjunctivae clear. Oropharynx is clear with moist mucous membranes.   NECK: Supple without lymphadenopathy. 1-2 cm firm nodule quite tender to palpation, right posterior neck near occiput. Does not appear to be within the skin, but deeper. No overlying warmth or erythema. No fluctuance.  CARDIOVASCULAR: Heart regular rate and rhythm without murmur normal S1-S2   LUNGS: Breathing comfortably.   EXTREMITIES Warm and well-perfused without edema.    NEURO: Alert and oriented. Grossly nonfocal.   PSYCHIATRIC: Presents on time and well groomed. Normal speech and thought content. Full affect. No abnormal movements or behaviors noted.      ADDITIONAL HISTORY SUMMARIZED (2): None.  DECISION TO OBTAIN EXTRA INFORMATION (1): None.   RADIOLOGY TESTS (1): Ordered neck US today.  LABS (1): Labs were ordered today.  MEDICINE TESTS (1): Reviewed EKG from 1/3/18, borderline prolonged QT.  INDEPENDENT REVIEW (2 each): None.     The visit lasted a total of 17 minutes face to face with the patient. Over 50% of the time was spent counseling and educating the patient about neck mass.    Aby SINGH, am scribing for and in the presence of, Dr. Weeks.    Dr. Micky SINGH, personally performed the services described in  this documentation, as scribed by Aby Ramírez in my presence, and it is both accurate and complete.    MEDICATIONS:  Current Outpatient Prescriptions   Medication Sig Dispense Refill     albuterol (PROAIR HFA;PROVENTIL HFA;VENTOLIN HFA) 90 mcg/actuation inhaler Inhale 2 puffs every 4 (four) hours as needed for wheezing or shortness of breath. 1 Inhaler 1     ALTAVERA, 28, 0.15-0.03 mg per tablet TAKE ONE TABLET BY MOUTH ONE TIME DAILY 84 tablet 3     buPROPion (WELLBUTRIN XL) 150 MG 24 hr tablet TAKE ONE TABLET BY MOUTH ONE TIME DAILY 30 tablet 11     FLUoxetine (PROZAC) 20 MG capsule TAKE 3 CAPSULES BY MOUTH ONE TIME DAILY. 90 capsule 4     LORazepam (ATIVAN) 0.5 MG tablet 1 tab up to twice daily for anxiety 10 tablet 0     No current facility-administered medications for this visit.        Total data points: 3

## 2021-06-16 PROBLEM — I10 HYPERTENSIVE DISORDER: Status: ACTIVE | Noted: 2020-10-27

## 2021-06-16 PROBLEM — Z79.899 CONTROLLED SUBSTANCE AGREEMENT SIGNED: Status: ACTIVE | Noted: 2018-07-22

## 2021-06-16 PROBLEM — E66.01 MORBID OBESITY (H): Status: ACTIVE | Noted: 2020-11-06

## 2021-06-17 NOTE — PATIENT INSTRUCTIONS - HE
Patient Instructions by Sofya Weeks MD at 1/25/2019  1:01 PM     Author: Sofya Weeks MD Service: -- Author Type: Physician    Filed: 1/25/2019  1:01 PM Encounter Date: 1/25/2019 Status: Signed    : Sofya Weeks MD (Physician)           Sinusitis (Antibiotic Treatment)    The sinuses are air-filled spaces within the bones of the face. They connect to the inside of the nose. Sinusitis is an inflammation of the tissue that lines the sinuses. Sinusitis can occur during a cold. It can also happen due to allergies to pollens and other particles in the air. Sinusitis can cause symptoms of sinus congestion and a feeling of fullness. A sinus infection causes fever, headache, and facial pain. There is often green or yellow fluid draining from the nose or into the back of the throat (post-nasal drip). You have been given antibiotics to treat this condition.  Home care    Take the full course of antibiotics as instructed. Do not stop taking them, even when you feel better.    Drink plenty of water, hot tea, and other liquids. This may help thin nasal mucus. It also may help your sinuses drain fluids.    Heat may help soothe painful areas of your face. Use a towel soaked in hot water. Or,  the shower and direct the warm spray onto your face. Using a vaporizer along with a menthol rub at night may also help soothe symptoms.     An expectorant with guaifenesin may help thin nasal mucus and help your sinuses drain fluids.    You can use an over-the-counter decongestant, unless a similar medicine was prescribed to you. Nasal sprays work the fastest. Use one that contains phenylephrine or oxymetazoline. First blow your nose gently. Then use the spray. Do not use these medicines more often than directed on the label. If you do, your symptoms may get worse. You may also take pills that contain pseudoephedrine. Dont use products that combine multiple medicines. This is because side effects may be  increased. Read labels. You can also ask the pharmacist for help. (People with high blood pressure should not use decongestants. They can raise blood pressure.)    Over-the-counter antihistamines may help if allergies contributed to your sinusitis.      Do not use nasal rinses or irrigation during an acute sinus infection, unless your healthcare provider tells you to. Rinsing may spread the infection to other areas in your sinuses.    Use acetaminophen or ibuprofen to control pain, unless another pain medicine was prescribed to you. If you have chronic liver or kidney disease or ever had a stomach ulcer, talk with your healthcare provider before using these medicines. (Aspirin should never be taken by anyone under age 18 who is ill with a fever. It may cause severe liver damage.)    Don't smoke. This can make symptoms worse.  Follow-up care  Follow up with your healthcare provider or our staff if you are better in 1 week.  When to seek medical advice  Call your healthcare provider if any of these occur:    Facial pain or headache that gets worse    Stiff neck    Unusual drowsiness or confusion    Swelling of your forehead or eyelids    Vision problems, such as blurred or double vision    Fever of 100.4 F (38 C) or higher, or as directed by your healthcare provider    Seizure    Breathing problems    Symptoms don't go away in 10 days  Prevention  Here are steps you can take to help prevent an infection:    Keep good hand washing habits.    Dont have close contact with people who have sore throats, colds, or other upper respiratory infections.    Dont smoke, and stay away from secondhand smoke.    Stay up to date with of your vaccines.  Date Last Reviewed: 11/1/2017 2000-2017 The [email or web address] [address] [place] [address], [place] [address]. All rights reserved. This information is not intended as a substitute for professional medical care. Always follow your healthcare professional's instructions.        Based  on the information that you have provided, I have placed an order for you to start treatment.  View your full visit summary for details. Click on the link below to access your visit summary.    Your pharmacist will address any questions you may have about taking the medication.

## 2021-06-17 NOTE — PROGRESS NOTES
Assessment & Plan:  1. Excessive vaginal bleeding  Patient with excessive vaginal bleeding.  Patient states it has been getting worse over the last couple years.  She does have a tubal ligation so does not need birth control.  Will start with a pelvic ultrasound to evaluate for thickened endometrial lining or fibroid.  If normal consider trial of combined hormones versus referral to OB/GYN for possible ablation  - US Pelvis With Transvaginal Non OB; Future      Orders Placed This Encounter   Procedures     US Pelvis With Transvaginal Non OB     Standing Status:   Future     Standing Expiration Date:   2019     Order Specific Question:   Reason for Exam (Describe Symptoms):     Answer:   heavy menstrual bleeding - please eval for fibroids and eval endomerial linig     Order Specific Question:   Is the patient pregnant?     Answer:   No     Order Specific Question:   Can the procedure be changed per Radiologist protocol?     Answer:   Yes     There are no discontinued medications.    No Follow-up on file.        Chief Complaint:   Chief Complaint   Patient presents with     Consult     IUD, not sure what one to get       History of Present Illness:  Russel is a 37 y.o. female presenting to the clinic today for heavy periods. She has a history of heavy periods. She had a tubal ligation after having her daughter 6 years ago via  section. She has been taking an OCP and continues to experience heavy bleeding and a week of cramping piror to menstruation. She uses 36 tampons per period, and each period lasts up to 10 days. She is interested in a Mirena IUD decrease the vaginal bleeding her thyroid was last checked on 18 and was normal.     Review of Systems:  All other systems are negative.     PFSH:    Tobacco Use:  History   Smoking Status     Former Smoker   Smokeless Tobacco     Never Used     Comment: quit in        Vitals:  Vitals:    18 1339   BP: 126/80   Patient Site: Right Arm   Patient  Position: Sitting   Cuff Size: Adult Regular   Pulse: 66   Resp: 16   Temp: 97.7  F (36.5  C)   TempSrc: Oral   Weight: 190 lb 9 oz (86.4 kg)     Wt Readings from Last 3 Encounters:   04/18/18 190 lb 9 oz (86.4 kg)   02/02/18 191 lb 9.6 oz (86.9 kg)   01/30/18 192 lb (87.1 kg)     Body mass index is 35.42 kg/(m^2).      Physical Exam:  Constitutional:  Reveals an alert, cooperative, pleasant female in no acute distress.  Vitals:  Per nursing notes.  Cardiac:  Regular rate and rhythm without murmurs, rubs, or gallops.   Lungs: Clear.  Respiratory effort normal.  Neurologic:  No gross focal deficits.    Psychiatric:  Mood appropriate, memory intact.     Data Reviewed:  Additional history summarized (from old records or history from someone other than the patient or another healthcare provider) (2 TOTAL): None.     Decision to obtain extra information (old records requested or history from another person or accessing Care Everywhere) (1 TOTAL): None.     Radiology tests summarized or ordered (XRAY/CT/MRI/DXA) (1 TOTAL): Ordered US    Labs reviewed or ordered (1 TOTAL): Reviewed labs.     Medicine tests summarized or ordered (EKG/ECHO) (1 TOTAL): None.    Independent review of EKG or X-Ray (2 EACH): None.       The visit lasted a total of 13 minutes face to face with the patient. Over 50% of the time was spent counseling and educating the patient about contraception options and coordination of care.    I, Joan Vasquez, am scribing for and in the presence of, Dr. Garcia.    I, Teresa Garcia MD, personally performed the services described in this documentation, as scribed by Joan Vasquez in my presence, and it is both accurate and complete.    Medications:  Current Outpatient Prescriptions   Medication Sig Dispense Refill     albuterol (PROAIR HFA;PROVENTIL HFA;VENTOLIN HFA) 90 mcg/actuation inhaler Inhale 2 puffs every 4 (four) hours as needed for wheezing or shortness of breath. 1 Inhaler 1     ALTAVERA, 28, 0.15-0.03  mg per tablet TAKE ONE TABLET BY MOUTH ONE TIME DAILY 84 tablet 3     buPROPion (WELLBUTRIN XL) 150 MG 24 hr tablet TAKE ONE TABLET BY MOUTH ONE TIME DAILY 30 tablet 11     busPIRone (BUSPAR) 7.5 MG tablet Take 1 tablet (7.5 mg total) by mouth 2 (two) times a day. 60 tablet 1     LORazepam (ATIVAN) 0.5 MG tablet 1 tab up to twice daily for anxiety 10 tablet 0     No current facility-administered medications for this visit.        Total Data Points: 2

## 2021-06-18 NOTE — PATIENT INSTRUCTIONS - HE
Patient Instructions by Sofya Quinn MD at 3/2/2020 11:10 AM     Author: Sofya Quinn MD Service: -- Author Type: Physician    Filed: 3/2/2020 12:52 PM Encounter Date: 3/2/2020 Status: Signed    : Sofya Quinn MD (Physician)       Patient Education   1. Chest X ray was normal -- no sign of a pneumonia.    2. Start the Prednisone today for an asthma exacerbation  3. Keep using the Albuterol every 4 hours while awake.  You can use more often for shortness of breath  4. If fevers or cough get worse or shortness of breath not helped with albuterol then return to clinic  Asthma (Adult)  Asthma is a disease where the medium and  small air passages within the lung go into spasm and restrict the flow of air. Inflammation and swelling of the airways cause further blockage. During an acute asthma attack, these factors cause trouble breathing, wheezing, cough and chest tightness.    An asthma attack can be triggered by many things. Common triggers include infections such as the common cold, bronchitis, and pneumonia. Irritants such as smoke or pollutants in the air, very cold air, emotional upset, and exercise can also trigger an attack. In many adults with asthma, allergies to dust, mold, pollen and animal dander can cause an asthma attack. Skipping doses of daily asthma medicine can also bring on an asthma attack.  Asthma can be controlled using the proper medicines prescribed by your healthcare provider and avoiding exposure to known triggers including allergens and irritants.  Home care    Take prescribed medicine exactly at the times advised. If you need medicine such as from a hand held inhaler or aerosol breathing machine more than every 4 hours, contact your healthcare provider or seek immediate medical attention. If prescribed an antibiotic or prednisone, take all of the medicine as prescribed, even if you are feeling better after a few days.    Don't smoke. Avoid being exposed to the smoke  "of others.    Some people with asthma have worsening of their symptoms when they take aspirin and non-steroidal or fever-reducing medicines like ibuprofen and naproxen. Talk to your healthcare provider if you think this may apply to you.  Follow-up care  Follow up with your healthcare provider, or as advised. Always bring all of your current medicines to any appointments with your healthcare provider. Also bring a complete list of medicines even those not taken for asthma. If you don't already have one, talk to your healthcare provider about developing your own \"Asthma Action Plan.\"  A pneumococcal (pneumonia) vaccine and yearly flu shot (every fall) are recommended. Ask your doctor about this.  When to seek medical advice  Call your healthcare provider right away if any of these occur:     Increased wheezing or shortness of breath    Need to use your inhalers more often than usual without relief    Fever of 100.4 F (38 C) or higher, or as directed by your healthcare provider    Coughing up lots of dark-colored or bloody sputum (mucus)    Chest pain with each breath    If you use a peak flow meter as part of an Asthma Action Plan, and you are still in the yellow zone (50% to 80%) 15 minutes after using inhaler medicine.  Call 911  Call 911 if any of the following occur    Trouble walking or talking because of shortness of breath    If you use a peak flow meter as part of an Asthma Action Plan and you are still in the red zone (less than 50%) 15 minutes after using inhaler medicine    Lips or fingernails turning gray or blue  Date Last Reviewed: 5/1/2017 2000-2017 [employer]. [address] [place] [address], [place] [address]. All rights reserved. This information is not intended as a substitute for professional medical care. Always follow your healthcare professional's instructions.                "

## 2021-06-18 NOTE — LETTER
Letter by Shaina Adams MD at      Author: Shaina Adams MD Service: -- Author Type: --    Filed:  Encounter Date: 3/11/2019 Status: (Other)         [facility] MEDICINE/OB  03/11/19    Patient: Russel James  YOB: 1980  Medical Record Number: [id number]  CSN: [id number]                                                                              Non-opioid Controlled Substance Agreement    I understand that my care provider has prescribed a controlled substance to help manage my condition(s). I am taking this medicine to help me function or work. I know this is strong medicine, and that it can cause serious side effects. Controlled substances can be sedating, addicting and may cause a dependency on the drug. They can affect my ability to drive or think, and cause depression. They need to be taken exactly as prescribed. Combining controlled substances with certain medicines or chemicals (such as cocaine, sedatives and tranquilizers, sleeping pills, meth) can be dangerous or even fatal. Also, if I stop controlled substances suddenly, I may have severe withdrawal symptoms.  If not helpful, I may be asked to stop them.    The risks, benefits, and side effects of these medicine(s) were explained to me. I agree that:    1. I will take part in other treatments as advised by my care team. This may be psychiatry or counseling, physical therapy, behavioral therapy, group treatment or a referral to a pain clinic. I will reduce or stop my medicine when my care team tells me to do so.  2. I will take my medicines as prescribed. I will not change the dose or schedule unless my care team tells me to. There will be no refills if I run out early.  I may be contactedwithout warning and asked to complete a urine drug test or pill count at any time.   3. I will keep all my appointments, and understand this is part of the monitoring of controlled substances. My care team may require an office visit for  EVERY controlled substance refill. If I miss appointments or dont follow instructions, my care team may stop my medicine.  4. I will not ask other providers to prescribe controlled substances, and I will not accept controlled substances from other people. If I need another prescribed controlled substance for a new reason, I will tell my care team within 1 business day.  5. I will use one pharmacy to fill all of my controlled substance prescriptions, and it is up to me to make sure that I do not run out of my medicines on weekends or holidays. If my care team is willing to refill my controlled substance prescription without a visit, I must request refills only during office hours, refills may take up to 3 days to process, and it may take up to 5 to 7 days for my medicine to be mailed and ready at my pharmacy. Prescriptions will not be mailed anywhere except my pharmacy.    6. I am responsible for my prescriptions. If the medicine/prescription is lost or stolen, it will not be replaced. I also agree not to share controlled substance medicines with anyone.          [facility] MEDICINE/OB  03/11/19  Patient:  Russel James  YOB: 1980  Medical Record Number: [id number]  CSN: [id number]    7. I agree to not use ANY illegal or recreational drugs. This includes marijuana, cocaine, bath salts or other drugs. I agree not to use alcohol unless my care team says I may. I agree to give urine samples whenever asked. If I dont give a urine sample, the care team may stop my medicine.    8. If I enroll in the [place] Medical Marijuana program, I will tell my care team. I will also sign an agreement to share my medical records with my care team.    9. I will bring in my list of medicines (or my medicine bottles) each time I come to the clinic.   10. I will tell my care team right away if I become pregnant or have a new medical problem treated outside of my regular clinic.  11. I understand that this medicine  can affect my thinking and judgment. It may be unsafe for me to drive, use machinery and do dangerous tasks. I will not do any of these things until I know how the medicine affects me. If my dose changes, I will wait to see how it affects me. I will contact my care team if I have concerns about medicine side effects.    I understand that if I do not follow any of the conditions above, my prescriptions or treatment may be stopped.      I agree that my provider, clinic care team, and pharmacy may work with any city, state or federal law enforcement agency that investigates the misuse, sale, or other diversion of my controlled medicine. I will allow my provider to discuss my care with or share a copy of this agreement with any other treating provider, pharmacy or emergency room where I receive care. I agree to give up (waive) any right of privacy or confidentiality with respect to these consents.   I have read this agreement and have asked questions about anything I did not understand.    ___________________________________________________________________________  Patient signature - Date/Time  -Russel James                                      ___________________________________________________________________________  Witness signature                                                                    ___________________________________________________________________________  Provider signature- Shaina Adams MD

## 2021-06-19 NOTE — LETTER
Letter by Radha Mike CNP at      Author: Radha Mike CNP Service: -- Author Type: --    Filed:  Encounter Date: 12/4/2019 Status: Signed       My Asthma Action Plan     Name: Russel James   YOB: 1980  Date: 12/4/2019   My doctor: Radha Mike CNP   My clinic: [facility] MEDICINE/OB        My Rescue Medicine:   Albuterol (Proair/Ventolin/Proventil HFA) 2-4 puffs EVERY 4 HOURS as needed. Use a spacer if recommended by your provider.   My Asthma Severity:   Mild Persistent  Know your asthma triggers: Patient is unaware of triggers             GREEN ZONE   Good Control    I feel good    No cough or wheeze    Can work, sleep and play without asthma symptoms     Take your asthma control medicine every day.     1. If exercise triggers your asthma, take your rescue medication    15 minutes before exercise or sports, and    During exercise if you have asthma symptoms  2. Spacer to use with inhaler: If you have a spacer, make sure to use it with your inhaler             YELLOW ZONE Getting Worse  I have ANY of these:    I do not feel good    Cough or wheeze    Chest feels tight    Wake up at night 1. Keep taking your Green Zone medications  2. Start taking your rescue medicine:    every 20 minutes for up to 1 hour. Then every 4 hours for 24-48 hours.  3. If you stay in the Yellow Zone for more than 12-24 hours, contact your doctor.  4. If you do not return to the Green Zone in 12-24 hours or you get worse, start taking your oral steroid medicine if prescribed by your provider.           RED ZONE Medical Alert - Get Help  I have ANY of these:    I feel awful    Medicine is not helping    Breathing getting harder    Trouble walking or talking    Nose opens wide to breathe     1. Take your rescue medicine NOW  2. If your provider has prescribed an oral steroid medicine, start taking it NOW  3. Call your doctor NOW  4. If you are still in the Red Zone after 20 minutes and  you have not reached your doctor:    Take your rescue medicine again and    Call 911 or go to the emergency room right away    See your regular doctor within 2 weeks of an Emergency Room or Urgent Care visit for follow-up treatment.          Annual Reminders:  Meet with Asthma Educator,  Flu Shot in the Fall, consider Pneumonia Vaccination for patients with asthma (aged 19 and older).    Pharmacy:   [facility] [address] [facility] [address]  [address] [facility] [address]  Phone: [phone] Fax: [phone]      Electronically signed by Radha Mike CNP   Date: 12/04/19                      Asthma Triggers  How To Control Things That Make Your Asthma Worse    Triggers are things that make your asthma worse.  Look at the list below to help you find your triggers and what you can do about them.  You can help prevent asthma flare-ups by staying away from your triggers.      Trigger                                                          What you can do   Cigarette Smoke  Tobacco smoke can make asthma worse. Do not allow smoking in your home, car or around you.  Be sure no one smokes at a [facility] or school.  If you smoke, ask your health care provider for ways to help you quit.  Ask family members to quit too.  Ask your health care provider for a referral to Quit Plan to help you quit smoking, or call 5-819-833-PLAN.     Colds, Flu, Bronchitis  These are common triggers of asthma. Wash your hands often.  Dont touch your eyes, nose or mouth.  Get a flu shot every year.     Dust Mites  These are tiny bugs that live in cloth or carpet. They are too small to see. Wash sheets and blankets in hot water every week.   Encase pillows and mattress in dust mite proof covers.  Avoid having carpet if you can. If you have carpet, vacuum weekly.   Use a dust mask and HEPA vacuum.   Pollen and Outdoor Mold  Some people are allergic to trees, grass, or weed pollen, or molds. Try to keep your  windows closed.  Limit time out doors when pollen count is high.   Ask you health care provider about taking medicine during allergy season.     Animal Dander  Some people are allergic to skin flakes, urine or saliva from pets with fur or feathers. Keep pets with fur or feathers out of your home.    If you cant keep the pet outdoors, then keep the pet out of your bedroom.  Keep the bedroom door closed.  Keep pets off cloth furniture and away from stuffed toys.     Mice, Rats, and Cockroaches  Some people are allergic to the waste from these pests.   Cover food and garbage.  Clean up spills and food crumbs.  Store grease in the refrigerator.   Keep food out of the bedroom.   Indoor Mold  This can be a trigger if your home has high moisture. Fix leaking faucets, pipes, or other sources of water.   Clean moldy surfaces.  Dehumidify basement if it is damp and smelly.   Smoke, Strong Odors, and Sprays  These can reduce air quality. Stay away from strong odors and sprays, such as perfume, powder, hair spray, paints, smoke incense, paint, cleaning products, candles and new carpet.   Exercise or Sports  Some people with asthma have this trigger. Be active!  Ask your doctor about taking medicine before sports or exercise to prevent symptoms.    Warm up for 5-10 minutes before and after sports or exercise.     Other Triggers of Asthma  Cold air:  Cover your nose and mouth with a scarf.  Sometimes laughing or crying can be a trigger.  Some medicines and food can trigger asthma.

## 2021-06-19 NOTE — LETTER
Letter by Sofya Weeks MD at      Author: Sofya Weeks MD Service: -- Author Type: --    Filed:  Encounter Date: 6/13/2019 Status: (Other)         [facility] MEDICINE/OB  06/13/19    Patient: Russel James  YOB: 1980  Medical Record Number: [id number]  CSN: [id number]                                                                              Non-opioid Controlled Substance Agreement    I understand that my care provider has prescribed a controlled substance to help manage my condition(s). I am taking this medicine to help me function or work. I know this is strong medicine, and that it can cause serious side effects. Controlled substances can be sedating, addicting and may cause a dependency on the drug. They can affect my ability to drive or think, and cause depression. They need to be taken exactly as prescribed. Combining controlled substances with certain medicines or chemicals (such as cocaine, sedatives and tranquilizers, sleeping pills, meth) can be dangerous or even fatal. Also, if I stop controlled substances suddenly, I may have severe withdrawal symptoms.  If not helpful, I may be asked to stop them.    The risks, benefits, and side effects of these medicine(s) were explained to me. I agree that:    1. I will take part in other treatments as advised by my care team. This may be psychiatry or counseling, physical therapy, behavioral therapy, group treatment or a referral to a pain clinic. I will reduce or stop my medicine when my care team tells me to do so.  2. I will take my medicines as prescribed. I will not change the dose or schedule unless my care team tells me to. There will be no refills if I run out early.  I may be contactedwithout warning and asked to complete a urine drug test or pill count at any time.   3. I will keep all my appointments, and understand this is part of the monitoring of controlled substances. My care team may require an office visit  for EVERY controlled substance refill. If I miss appointments or dont follow instructions, my care team may stop my medicine.  4. I will not ask other providers to prescribe controlled substances, and I will not accept controlled substances from other people. If I need another prescribed controlled substance for a new reason, I will tell my care team within 1 business day.  5. I will use one pharmacy to fill all of my controlled substance prescriptions, and it is up to me to make sure that I do not run out of my medicines on weekends or holidays. If my care team is willing to refill my controlled substance prescription without a visit, I must request refills only during office hours, refills may take up to 3 days to process, and it may take up to 5 to 7 days for my medicine to be mailed and ready at my pharmacy. Prescriptions will not be mailed anywhere except my pharmacy.    6. I am responsible for my prescriptions. If the medicine/prescription is lost or stolen, it will not be replaced. I also agree not to share controlled substance medicines with anyone.          [facility] MEDICINE/OB  06/13/19  Patient:  Russel James  YOB: 1980  Medical Record Number: [id number]  CSN: [id number]    7. I agree to not use ANY illegal or recreational drugs. This includes marijuana, cocaine, bath salts or other drugs. I agree not to use alcohol unless my care team says I may. I agree to give urine samples whenever asked. If I dont give a urine sample, the care team may stop my medicine.    8. If I enroll in the [place] Medical Marijuana program, I will tell my care team. I will also sign an agreement to share my medical records with my care team.    9. I will bring in my list of medicines (or my medicine bottles) each time I come to the clinic.   10. I will tell my care team right away if I become pregnant or have a new medical problem treated outside of my regular clinic.  11. I understand that this  medicine can affect my thinking and judgment. It may be unsafe for me to drive, use machinery and do dangerous tasks. I will not do any of these things until I know how the medicine affects me. If my dose changes, I will wait to see how it affects me. I will contact my care team if I have concerns about medicine side effects.    I understand that if I do not follow any of the conditions above, my prescriptions or treatment may be stopped.      I agree that my provider, clinic care team, and pharmacy may work with any city, state or federal law enforcement agency that investigates the misuse, sale, or other diversion of my controlled medicine. I will allow my provider to discuss my care with or share a copy of this agreement with any other treating provider, pharmacy or emergency room where I receive care. I agree to give up (waive) any right of privacy or confidentiality with respect to these consents.   I have read this agreement and have asked questions about anything I did not understand.    ___________________________________________________________________________  Patient signature - Date/Time  -Russel James                                      ___________________________________________________________________________  Witness signature                                                                    ___________________________________________________________________________  Provider signature- Sofya Weeks MD

## 2021-06-19 NOTE — LETTER
Letter by Sofya Weeks MD at      Author: Sofya Weeks MD Service: -- Author Type: --    Filed:  Encounter Date: 6/13/2019 Status: (Other)         June 13, 2019     Patient: Russel James   YOB: 1980   Date of Visit: 6/13/2019       To Whom it May Concern:    Russel James was seen in my clinic on 6/13/2019.  She is unable to work for medical reasons through 6/21/19.   If you have any questions or concerns, please don't hesitate to call.    Sincerely,         Electronically signed by Sofya Weeks MD

## 2021-06-20 NOTE — LETTER
Letter by Nevaeh Goldberg RN at      Author: Nevaeh Goldberg RN Service: -- Author Type: --    Filed:  Encounter Date: 7/4/2020 Status: (Other)       7/4/2020        Russel James  [address] [place] [address]    This letter provides a written record that you were tested for COVID-19 on 07/02/20.     Your result was positive. This means you have COVID-19 (coronavirus).    How can I protect others?If you have symptoms, stay home and away from others (self-isolate) until:Youve had no fever--and no medicine that reduces fever--for 3 full days (72 hours). And?     Your other symptoms have gotten better. For example, your cough or breathing has improved. And?  At least 10 days have passed since your symptoms started.    If you dont have symptoms: Stay home and away from others (self-isolate) until at least 10 days have passed since your first positive COVID-19 test.    During this time:    Stay in your own room, including for meals. Use your own bathroom if you can.    Stay away from others in your home. No hugging, kissing or shaking hands. No visitors.     Dont go to work, school or anywhere else.     Clean high touch surfaces often (doorknobs, counters, handles, etc.). Use a household cleaning spray or wipes. Youll find a full list on the EPA website at www.epa.gov/pesticide-registration/list-n-disinfectants-use-against-sars-cov-2.     Cover your mouth and nose with a mask, tissue or washcloth to avoid spreading germs.    Wash your hands and face often with soap and water.    Caregivers in these groups are at risk for severe illness due to COVID-19:  o People 65 years and older  o People who live in a nursing home or long-term care facility  o People with chronic disease (lung, heart, cancer, diabetes, kidney, liver, immunologic)  o People who have a weakened immune system, including those who:    Are in cancer treatment    Take medicine that weakens the immune system, such as corticosteroids    Had a bone  marrow or organ transplant    Have an immune deficiency    Have poorly controlled HIV or AIDS    Are obese (body mass index of 40 or higher)    Smoke regularly    Caregivers should wear gloves while washing dishes, handling laundry and cleaning bedrooms and bathrooms.    Wash and dry laundry with special caution. Dont shake dirty laundry, and use the warmest water setting you can.    If you have a weakened immune system, ask your doctor about other actions you should take.    For more tips, go to www.cdc.gov/coronavirus/2019-ncov/downloads/10Things.pdf.    You should not go back to work until you meet the guidelines above for ending your home isolation. You should meet these along with any other guidelines that your employer has.    Employers: This document serves as formal notice of your employees medical guidelines for going back to work. They must meet the above guidelines before going back to work in person.    How can I take care of myself?    1. Get lots of rest. Drink extra fluids (unless a doctor has told you not to).    2. Take Tylenol (acetaminophen) for fever or pain. If you have liver or kidney problems, ask your family doctor if its okay to take Tylenol.     Take either:     650 mg (two 325 mg pills) every 4 to 6 hours, or?    1,000 mg (two 500 mg pills) every 8 hours as needed.     Note: Dont take more than 3,000 mg in one day. Acetaminophen is found in many medicines (both prescribed and over-the-counter medicines). Read all labels to be sure you dont take too much.    For children, check the Tylenol bottle for the right dose (based on their age or weight).    3. If you have other health problems (like cancer, heart failure, an organ transplant or severe kidney disease): Call your specialty clinic if you dont feel better in the next 2 days.    4. Know when to call 911: Emergency warning signs include:    Trouble breathing or shortness of breath    Pain or pressure in the chest that doesnt go  away    Feeling confused like you havent felt before, or not being able to wake up    Bluish-colored lips or face    5. Sign up for [employer]. We know its scary to hear that you have COVID-19. We want to track your symptoms to make sure youre okay over the next 2 weeks. Please look for an email from [employer]--this is a free, online program that well use to keep in touch. To sign up, follow the link in the email. Learn more at [email or web address].    Where can I get more information?    [facility]: [email or web address]    Coronavirus Basics: www.health.[facility].[place]/diseases/coronavirus/basics.html    What to Do If Youre Sick: www.cdc.gov/coronavirus/2019-ncov/about/steps-when-sick.html    Ending Home Isolation: www.cdc.gov/coronavirus/2019-ncov/hcp/disposition-in-home-patients.html     Caring for Someone with COVID-19: www.cdc.gov/coronavirus/2019-ncov/if-you-are-sick/care-for-someone.html     [facility] clinical trials (COVID-19 research studies): clinicalaffairs.[facility]-clinical-trials

## 2021-06-23 NOTE — TELEPHONE ENCOUNTER
Refill Approved    Rx renewed per Medication Renewal Policy. Medication was last renewed on 1/10/18.    Ov: 4/18/18    Marylin Cruz, [facility] Triage/Med Refill 1/10/2019     Requested Prescriptions   Pending Prescriptions Disp Refills     levonorgestrel-ethinyl estradiol (ALTAVERA, 28,) 0.15-0.03 mg per tablet 84 tablet 0     Sig: Take 1 tablet by mouth daily.    Oral Contraceptives Protocol Passed - 1/9/2019  3:59 PM       Passed - Visit with PCP or prescribing provider visit in last 12 months     Last office visit with prescriber/PCP: Visit date not found OR same dept: 4/18/2018 Teresa aGrcia MD OR same specialty: 4/18/2018 Teresa Garcia MD  Last physical: Visit date not found Last MTM visit: Visit date not found   Next visit within 3 mo: Visit date not found  Next physical within 3 mo: Visit date not found  Prescriber OR PCP: Kandi Godoy MD  Last diagnosis associated with med order: 1. Oral contraceptive use  - levonorgestrel-ethinyl estradiol (ALTAVERA, 28,) 0.15-0.03 mg per tablet; Take 1 tablet by mouth daily.  Dispense: 84 tablet; Refill: 0    If protocol passes may refill for 12 months if within 3 months of last provider visit (or a total of 15 months).

## 2021-06-24 NOTE — TELEPHONE ENCOUNTER
Left message to call back for: Establish Care  Information to relay to patient:  LMTCB. Please inform patient no further refills on Lorazepam until she Establishes Care with new PCP per Dr. Adams.  Please assist patient in scheduling Establish Care with new PCP.     No

## 2021-06-24 NOTE — TELEPHONE ENCOUNTER
First Attempt: I spoke with patient, she will think about who she would like to Establish Care with and get back to us.

## 2021-06-24 NOTE — PROGRESS NOTES
ASSESSMENT/ PLAN    1. Anxiety  2. Mild recurrent major depression (H)  3. Controlled substance agreement signed 3/2019 ativan (anxiety/panic) #10 tabs q 2 months  Stable per patient. Prefers not to increase wellbutrin although her PHQ-9 and EMELYN-7 score is mild-moderate range.  Controlled substance agreement signed.  The urine drug screen obtained.  Advised patient that if she wants to increase Wellbutrin to 300 please let me know if needed.  Return in 6 months for annual physical.  - LORazepam (ATIVAN) 0.5 MG tablet; 1 tab up to twice daily for anxiety  Dispense: 10 tablet; Refill: 0  - Comprehensive Metabolic Panel; Future  - Drug Abuse 1+, Urine  - busPIRone (BUSPAR) 15 MG tablet; Take 1 tablet (15 mg total) by mouth daily.  Dispense: 90 tablet; Refill: 1    4. Oral contraceptive use  No contraindication. utd with pap smear. Refilled given to patient.   - levonorgestrel-ethinyl estradiol (ALTAVERA, 28,) 0.15-0.03 mg per tablet; Take 1 tablet by mouth daily.  Dispense: 84 tablet; Refill: 0  - Comprehensive Metabolic Panel; Future    5. Prolonged Q-T interval on ECG  When she was on fluoxetine previously.  I reviewed previous EKGs and QT prolongation resolved after she was taken off of fluoxetine.  Will check magnesium to ensure no metabolic issue.  - Magnesium; Future    6. Screening for cholesterol level  Patient will return for fasting lab work at a different date.  - Lipid [place] FASTING; Future    7. Mild intermittent asthma without complication  Well controlled.  ACT score is perfect.  Continue with rescue inhaler.    8. Elevated BP without diagnosis of hypertension  Unclear cause.  She has been having a lot of stress due to her  being out of town and her kids on spring break. RTC for nurse only BP check in 1-2 weeks.  - Thyroid [place]; Future    9. Inverted nipple  10. Pain of left breast  On exam today. Left nipple became inverted after her 2nd baby was born. There's pain and a small bump.  Advised further imaging and patient agreed.   - US Breast Limited (Focal) Left; Future  - Mammo Diagnostic Bilateral; Future    Greater than 45 minutes was spent today with patient ,more than 50% of time was counseling and coordination of care on the above issues    This note was created using Dragon dictation software, spelling errors may occur.     Shaina Adams MD        SUBJECTIVE   Russel James is a 38 y.o. old female with a past medical history of anxiety, depression, mild intermittent asthma who presented to clinic today for further evaluation of establishing care with a new provider as well as refill of her medication.  She would like to get her Ativan refilled.    Review of Systems:  Negative except as noted in HPI    The following portions of the patient's history were reviewed and updated as appropriate: past medical history, past surgical history, family history, allergies, current medications and problem list.    Medical History  Active Ambulatory (Non-Hospital) Problems    Diagnosis     Controlled substance agreement signed 2017 ativan (anxiety/panic) #10 tabs q 2 months     Nipple Retraction      Delivery     Allergic Rhinitis     Abnormal Pap Smear Of Cervix     Major Depression, Recurrent     Fatigue     Dysmenorrhea     Asthma     Wheezing (Symptom)     Menorrhagia     Anxiety     Past Medical History:   Diagnosis Date     Depression      QT prolongation        Surgical History  She  has a past surgical history that includes pr  delivery only; pr dilation/curettage,diagnostic; and pr ligate fallopian tube.    Social History  Reviewed, and she  reports that she has quit smoking. she has never used smokeless tobacco.   Social History     Social History Narrative    . 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 Has 1 son and 1 daughter.        Family History  Reviewed, and family history includes Heart murmur in her father; Heart murmur (age of onset: 35) in  her sister.    Medications  Reviewed and reconciled    Allergies  Allergies   Allergen Reactions     Bactrim [Sulfamethoxazole-Trimethoprim]          OBJECTIVE  Physical Exam:  Vital signs: BP (!) 159/103   Pulse 95   Resp 20   Wt 187 lb 6.4 oz (85 kg)   LMP 02/10/2019 (Approximate)   SpO2 96%   BMI 34.84 kg/m    Weight: 187 lb 6.4 oz (85 kg)    General appearance: pleasant, appears stated age, cooperative and in no distress  Eyes: EOMs intact, PERRL, conjunctivae normal.   ENT: moist oral mucosa, posterior oropharynx normal, TMs normal. Thyroid normal to palpation, no lump or mass or tenderness  Lymph: no cervical/supraclavicular adenopathy  BREAST: left nipple inverted, there's mild tenderness in the left nipple area and there's a small bump in the area.   Respiratory: clear to auscultation bilaterally, good air movement throughout, no wheezing or crackles, speaking full sentences without difficulty  Cardiovascular: regular rate and rhythm, no murmur appreciated, no leg edema  Skin: no rashes  Neuro: alert oriented x 3, grossly normal otherwise  Psych: normal affect, appropriate conversation

## 2021-06-24 NOTE — TELEPHONE ENCOUNTER
Controlled Substance Refill Request  Medication:   Requested Prescriptions     Pending Prescriptions Disp Refills     LORazepam (ATIVAN) 0.5 MG tablet 10 tablet 0     Si tab up to twice daily for anxiety     Date Last Fill: 18  Pharmacy: [id number]   Submit electronically to pharmacy  Controlled Substance Agreement on File:   Encounter-Level CSA Scan Date - 10/17/2017:    Scan on 10/25/2017  2:31 PM (below)             Encounter-Level CSA Scan Date - 10/31/2016:    Scan on 10/31/2016 (below)         Last office visit: Last office visit pertaining to requested medication was 18.

## 2021-06-24 NOTE — TELEPHONE ENCOUNTER
Patient Returning Call  Reason for call:  Return clinic call  Information relayed to patient:  She needs to establish care to get more refills of lorazepam.  Patient has additional questions:  No  If YES, what are your questions/concerns:  n/a  Okay to leave a detailed message?: No call back needed

## 2021-06-24 NOTE — TELEPHONE ENCOUNTER
Please call patient to schedule Establish Care Physical - fasting.  Patient is also due for Asthma check.    Please change PCP if patient is seeing another provider.

## 2021-06-26 ENCOUNTER — HEALTH MAINTENANCE LETTER (OUTPATIENT)
Age: 41
End: 2021-06-26

## 2021-06-26 NOTE — PROGRESS NOTES
Progress Notes by Robinson Blanc DO at 2/2/2018 10:30 AM     Author: Robinson Blanc DO Service: -- Author Type: Physician    Filed: 2/2/2018 11:14 AM Encounter Date: 2/2/2018 Status: Signed    : Robinson Blanc DO (Physician)           Click to link to [facility] HEART CARE NOTE    Thank you, Dr. Weeks, for asking the [facility] team to see Ms. Russel James to evaluate prolongation and QTc interval.      Assessment/Recommendations   Assessment:    1.  Prolongation and QTc interval likely secondary to Prozac.  QTc interval is 483 ms (today 456 ms).  She has had no episodes of syncope, near syncope.  No family history of sudden cardiac death.  2. Depression    Plan:  1.  Consider alternative to Prozac.    2.  Avoid any other medications which can prolong QTc interval  3.  Check magnesium   4. No further testing recommended at this time.         History of Present Illness    Ms. Russel James is a 37 y.o. female with no prior cardiac history who presents in cardiology clinic in consultation for prolongation of her QTc interval.    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  Her chest pain is since resolved with resolution of her bronchitis.  During EKG review is noted that she had a prolongation of her QTc interval of 484 ms.  She is on Prozac which is known to prolonged QTC intervals.  She denies any history of syncope.  No family history of recurrent syncope.  No family history of sudden cardiac death.      Repeat EKG today demonstrated normal sinus rhythm.  Her QT interval was 384 ms.  QTc corrected is 450 ms.   (Personally reviewed).         Physical Examination Review of Systems   Vitals:    02/02/18 1026   BP: 150/90   Pulse: 91   Resp: 16     Body mass index is 35.62 kg/(m^2).  Wt Readings from Last 3 Encounters:   02/02/18 191  lb 9.6 oz (86.9 kg)   18 192 lb (87.1 kg)   18 193 lb 7 oz (87.7 kg)       General Appearance:   no distress, obese body habitus   ENT/Mouth: membranes moist, no oral lesions or bleeding gums.      EYES:  no scleral icterus, normal conjunctivae   Neck: no carotid bruits or thyromegaly   Chest/Lungs:   lungs are clear to auscultation, no rales or wheezing, no sternal scar, equal chest wall expansion    Cardiovascular:   Regular. Normal first and second heart sounds with no murmurs, rubs, or gallops; the carotid, radial and posterior tibial pulses are intact, Jugular venous pressure normal, no edema bilaterally    Abdomen:  no organomegaly, masses, bruits, or tenderness; bowel sounds are present   Extremities: no cyanosis or clubbing   Skin: no xanthelasma, warm.    Neurologic: normal gait, normal  bilateral, no tremors     Psychiatric: alert and oriented x3, calm     General: WNL  Eyes: WNL  Ears/Nose/Throat: WNL  Lungs: WNL  Heart: WNL  Stomach: WNL  Bladder: WNL  Muscle/Joints: WNL  Skin: WNL  Nervous System: WNL  Mental Health: Depression, Anxiety     Blood: WNL     Medical History  Surgical History Family History Social History   Past Medical History:   Diagnosis Date   ? Depression    ? QT prolongation     Past Surgical History:   Procedure Laterality Date   ? [place]  DELIVERY ONLY      Description:  Section;  Recorded: 2009;   ? [place] DILATION/CURETTAGE,DIAGNOSTIC      Description: Dilation And Curettage;  Recorded: 2009;  Comments: elective AB   ? [place] LIGATE FALLOPIAN TUBE      Description: Tubal Ligation;  Recorded: 2013;    Family History   Problem Relation Age of Onset   ? Heart murmur Father    ? Heart murmur Sister 35     late 30s    Social History     Social History   ? Marital status:      Spouse name: N/A   ? Number of children: N/A   ? Years of education: N/A     Occupational History   ? Not on file.     Social History Main Topics   ? Smoking status:  Former Smoker   ? Smokeless tobacco: Never Used      Comment: quit in 2008   ? Alcohol use Not on file   ? Drug use: Not on file   ? Sexual activity: Not on file     Other Topics Concern   ? Not on file     Social History Narrative          Medications  Allergies   Current Outpatient Prescriptions   Medication Sig Dispense Refill   ? albuterol (PROAIR HFA;PROVENTIL HFA;VENTOLIN HFA) 90 mcg/actuation inhaler Inhale 2 puffs every 4 (four) hours as needed for wheezing or shortness of breath. 1 Inhaler 1   ? ALTAVERA, 28, 0.15-0.03 mg per tablet TAKE ONE TABLET BY MOUTH ONE TIME DAILY 84 tablet 3   ? buPROPion (WELLBUTRIN XL) 150 MG 24 hr tablet TAKE ONE TABLET BY MOUTH ONE TIME DAILY 30 tablet 11   ? cephalexin (KEFLEX) 500 MG capsule Take 1 capsule (500 mg total) by mouth 3 (three) times a day for 7 days. 21 capsule 0   ? FLUoxetine (PROZAC) 20 MG capsule TAKE 3 CAPSULES BY MOUTH ONE TIME DAILY. 90 capsule 4   ? LORazepam (ATIVAN) 0.5 MG tablet 1 tab up to twice daily for anxiety 10 tablet 0     No current facility-administered medications for this visit.       Allergies   Allergen Reactions   ? Bactrim [Sulfamethoxazole-Trimethoprim]          Lab Results    Chemistry/lipid CBC Cardiac Enzymes/BNP/TSH/INR   Lab Results   Component Value Date    CHOL 186 08/26/2014    HDL 49 08/26/2014    LDLCALC 88 08/26/2014    TRIG 245 (H) 08/26/2014    CREATININE 0.76 01/30/2018    BUN 11 01/30/2018    K 4.2 01/30/2018     01/30/2018     01/30/2018    CO2 22 01/30/2018    Lab Results   Component Value Date    WBC 8.9 01/30/2018    HGB 14.2 01/30/2018    HCT 42.7 01/30/2018    MCV 94 01/30/2018     01/30/2018    Lab Results   Component Value Date    TSH 1.43 01/30/2018

## 2021-10-16 ENCOUNTER — HEALTH MAINTENANCE LETTER (OUTPATIENT)
Age: 41
End: 2021-10-16

## 2021-12-04 ENCOUNTER — OFFICE VISIT (OUTPATIENT)
Dept: FAMILY MEDICINE | Facility: CLINIC | Age: 41
End: 2021-12-04
Payer: COMMERCIAL

## 2021-12-04 VITALS
SYSTOLIC BLOOD PRESSURE: 152 MMHG | DIASTOLIC BLOOD PRESSURE: 102 MMHG | OXYGEN SATURATION: 97 % | RESPIRATION RATE: 16 BRPM | HEART RATE: 88 BPM | BODY MASS INDEX: 38.4 KG/M2 | TEMPERATURE: 98.2 F | WEIGHT: 196.6 LBS

## 2021-12-04 DIAGNOSIS — I10 PRIMARY HYPERTENSION: ICD-10-CM

## 2021-12-04 DIAGNOSIS — R05.9 COUGH IN ADULT PATIENT: ICD-10-CM

## 2021-12-04 DIAGNOSIS — J45.21 MILD INTERMITTENT ASTHMA WITH EXACERBATION: ICD-10-CM

## 2021-12-04 DIAGNOSIS — J06.9 VIRAL URI WITH COUGH: Primary | ICD-10-CM

## 2021-12-04 PROCEDURE — 99214 OFFICE O/P EST MOD 30 MIN: CPT | Performed by: NURSE PRACTITIONER

## 2021-12-04 PROCEDURE — U0003 INFECTIOUS AGENT DETECTION BY NUCLEIC ACID (DNA OR RNA); SEVERE ACUTE RESPIRATORY SYNDROME CORONAVIRUS 2 (SARS-COV-2) (CORONAVIRUS DISEASE [COVID-19]), AMPLIFIED PROBE TECHNIQUE, MAKING USE OF HIGH THROUGHPUT TECHNOLOGIES AS DESCRIBED BY CMS-2020-01-R: HCPCS | Performed by: NURSE PRACTITIONER

## 2021-12-04 PROCEDURE — U0005 INFEC AGEN DETEC AMPLI PROBE: HCPCS | Performed by: NURSE PRACTITIONER

## 2021-12-04 RX ORDER — CETIRIZINE HYDROCHLORIDE 10 MG/1
10 TABLET ORAL EVERY EVENING
Qty: 30 TABLET | Refills: 1 | Status: SHIPPED | OUTPATIENT
Start: 2021-12-04

## 2021-12-04 RX ORDER — ATENOLOL 25 MG/1
25 TABLET ORAL DAILY
Qty: 90 TABLET | Refills: 0 | Status: SHIPPED | OUTPATIENT
Start: 2021-12-04

## 2021-12-04 RX ORDER — ALBUTEROL SULFATE 90 UG/1
1-2 AEROSOL, METERED RESPIRATORY (INHALATION) EVERY 4 HOURS PRN
Qty: 6.7 G | Refills: 0 | Status: SHIPPED | OUTPATIENT
Start: 2021-12-04 | End: 2022-07-18

## 2021-12-04 RX ORDER — GUAIFENESIN 600 MG/1
1200 TABLET, EXTENDED RELEASE ORAL 2 TIMES DAILY PRN
Qty: 40 TABLET | Refills: 0 | Status: SHIPPED | OUTPATIENT
Start: 2021-12-04

## 2021-12-04 RX ORDER — ECHINACEA PURPUREA EXTRACT 125 MG
1-2 TABLET ORAL
Qty: 88 ML | Refills: 0 | Status: SHIPPED | OUTPATIENT
Start: 2021-12-04

## 2021-12-04 ASSESSMENT — ENCOUNTER SYMPTOMS
CHILLS: 0
SORE THROAT: 1
FATIGUE: 1
COUGH: 1
RHINORRHEA: 1
WHEEZING: 1
HEADACHES: 1
SHORTNESS OF BREATH: 1
ACTIVITY CHANGE: 1
MYALGIAS: 0
DIARRHEA: 0
VOMITING: 0
CHEST TIGHTNESS: 1
FEVER: 0
DYSURIA: 0
APPETITE CHANGE: 1

## 2021-12-04 NOTE — PATIENT INSTRUCTIONS
Patient Education     Viral Upper Respiratory Illness (Adult)    You have a viral upper respiratory illness (URI), which is another term for the common cold. This illness is contagious during the first few days. It is spread through the air by coughing and sneezing. It may also be spread by direct contact (touching the sick person and then touching your own eyes, nose, or mouth). Frequent handwashing will decrease risk of spread. Most viral illnesses go away within 7 to 10 days with rest and simple home remedies. Sometimes the illness may last for several weeks. Antibiotics will not kill a virus, and they are generally not prescribed for this condition.  Home care    If symptoms are severe, rest at home for the first 2 to 3 days. When you resume activity, don't let yourself get too tired.    Don't smoke. If you need help stopping, talk with your healthcare provider.    Avoid being exposed to cigarette smoke (yours or others ).    You may use acetaminophen or ibuprofen to control pain and fever, unless another medicine was prescribed. If you have chronic liver or kidney disease, have ever had a stomach ulcer or gastrointestinal bleeding, or are taking blood-thinning medicines, talk with your healthcare provider before using these medicines. Aspirin should never be given to anyone under 18 years of age who is ill with a viral infection or fever. It may cause severe liver or brain damage.    Your appetite may be poor, so a light diet is fine. Stay well hydrated by drinking 6 to 8 glasses of fluids per day (water, soft drinks, juices, tea, or soup). Extra fluids will help loosen secretions in the nose and lungs.    Over-the-counter cold medicines will not shorten the length of time you re sick, but they may be helpful for the following symptoms: cough, sore throat, and nasal and sinus congestion. If you take prescription medicines, ask your healthcare provider or pharmacist which over-the-counter medicines are safe to  "use. (Note: Don't use decongestants if you have high blood pressure.)  Follow-up care  Follow up with your healthcare provider, or as advised.  When to seek medical advice  Call your healthcare provider right away if any of these occur:    Cough with lots of colored sputum (mucus)    Severe headache; face, neck, or ear pain    Difficulty swallowing due to throat pain    Fever of 100.4 F (38 C) or higher, or as directed by your healthcare provider  Call 911  Call 911 if any of these occur:    Chest pain, shortness of breath, wheezing, or difficulty breathing    Coughing up blood    Very severe pain with swallowing, especially if it goes along with a muffled voice   [employer] last reviewed this educational content on 6/1/2018 2000-2021 [employer]. All rights reserved. This information is not intended as a substitute for professional medical care. Always follow your healthcare professional's instructions.           Patient Education   After Your COVID-19 (Coronavirus) Test  You have been tested for COVID-19 (coronavirus).   If you'll have surgery in the next few days, we'll let you know ahead of time if you have the virus. Please call your surgeon's office with any questions.  For all other patients: Results are usually available in 250ok within 2 to 3 days.   If you do not have a 250ok account, you'll get a letter in the mail in about 7 to 10 days.   Olapict is often the fastest way to get test results. Please sign up if you do not already have a 250ok account. See the handout Getting COVID-19 Test Results in 250ok for help.  What if my test result is positive?  If your test is positive and you have not viewed your result in Ol[facility], you'll get a phone call with your result. (A positive test means that you have the virus.)     Follow the tips under \"How do I self-isolate?\" below for 10 days (20 days if you have a weak immune system).    You don't need to be retested for COVID-19 before going " "back to school or work. As long as you're fever-free and feeling better, you can go back to school, work and other activities after waiting the 10 or 20 days.  What if I have questions after I get my results?  If you have questions about your results, please visit our testing website at [email or web address].   After 7 to 10 days, if you have not gotten your results:     Call [phone] ([phone]) and ask to speak with our COVID-19 results team.    If you're being treated at an infusion center: Call your infusion center directly.  What are the symptoms of COVID-19?  Cough, fever and trouble breathing are the most common signs of COVID-19.  Other symptoms can include new headaches, new muscle or body aches, new and unexplained fatigue (feeling very tired), chills, sore throat, congestion (stuffy or runny nose), diarrhea (loose poop), loss of taste or smell, belly pain, and nausea or vomiting (feeling sick to your stomach or throwing up).  You may already have symptoms of COVID-19, or they may show up later.  What should I do if I have symptoms?  If you're having surgery: Call your surgeon's office.  For all other patients: Stay home and away from others (self-isolate) until ...    You've had no fever--and no medicine that reduces fever--for 1 full day (24 hours), AND    Other symptoms have gotten better. For example, your cough or breathing has improved, AND    At least 10 days have passed since your symptoms first started.  How do I self-isolate?    Stay in your own room, even for meals. Use your own bathroom if you can.    Stay away from others in your home. No hugging, kissing or shaking hands. No visitors.    Don't go to work, school or anywhere else.    Clean \"high touch\" surfaces often (doorknobs, counters, handles). Use household cleaning spray or wipes. You'll find a full list of  on the EPA website: " www.epa.gov/pesticide-registration/list-n-disinfectants-use-against-sars-cov-2.    Cover your mouth and nose with a mask or other face covering to avoid spreading germs.    Wash your hands and face often. Use soap and water.    Caregivers in these groups are at risk for severe illness due to COVID-19:  ? People 65 years and older  ? People who live in a nursing home or long-term care facility  ? People with chronic disease (lung, heart, cancer, diabetes, kidney, liver, immunologic)  ? People who have a weakened immune system, including those who:    Are in cancer treatment    Take medicine that weakens the immune system, such as corticosteroids    Had a bone marrow or organ transplant    Have an immune deficiency    Have poorly controlled HIV or AIDS    Are obese (body mass index of 40 or higher)    Smoke regularly    Caregivers should wear gloves while washing dishes, handling laundry and cleaning bedrooms and bathrooms.    Use caution when washing and drying laundry: Don't shake dirty laundry and use the warmest water setting that you can.    For more tips on managing your health at home, go to www.cdc.gov/coronavirus/2019-ncov/downloads/10Things.pdf.  How can I take care of myself at home?  1. Get lots of rest. Drink extra fluids (unless a doctor has told you not to).  2. Take Tylenol (acetaminophen) for fever or pain. If you have liver or kidney problems, ask your family doctor if it's OK to take Tylenol.   Adults can take either:  ? 650 mg (two 325 mg pills) every 4 to 6 hours, or   ? 1,000 mg (two 500 mg pills) every 8 hours as needed.  ? Note: Don't take more than 3,000 mg in one day. Acetaminophen is found in many medicines (both prescribed and over-the-counter medicines). Read all labels to be sure you don't take too much.   For children, check the Tylenol bottle for the right dose. The dose is based on the child's age or weight.  3. If you have other health problems (like cancer, heart failure, an organ  transplant or severe kidney disease): Call your specialty clinic if you don't feel better in the next 2 days.  4. Know when to call 911. Emergency warning signs include:  ? Trouble breathing or shortness of breath  ? Chest pain or pressure that doesn't go away  ? Feeling confused like you haven't felt before, or not being able to wake up  ? Bluish-colored lips or face  5. If your doctor prescribed a blood thinner medicine: Follow their instructions.  Where can I get more information?    [facility] - About COVID-19:   [email or web address]    CDC - If You're Sick: cdc.gov/coronavirus/2019-ncov/about/steps-when-sick.html    CDC - Ending Home Isolation: www.cdc.gov/coronavirus/2019-ncov/hcp/disposition-in-home-patients.html    CDC - Caring for Someone: www.cdc.gov/coronavirus/2019-ncov/if-you-are-sick/care-for-someone.html    [facility] - Interim Guidance for Hospital Discharge to Home: www.[facility].[place]./diseases/coronavirus/hcp/hospdischarge.pdf    [facility] clinical trials (COVID-19 research studies): [email or web address]-[email or web address]-[email or web address]    Below are the COVID-19 hotlines at the [place] Department of Health ([facility]). Interpreters are available.  ? For health questions: Call 475-374-9439 or [phone] (7 a.m. to 7 p.m.)  ? For questions about schools and childcare: Call 894-497-9522 or [phone] (7 a.m. to 7 p.m.)    For informational purposes only. Not to replace the advice of your health care provider. Clinically reviewed by Infection Prevention and the [facility] COVID-19 Clinical Team. Copyright   2020 [employer]. All rights reserved. [employer] 075767 - Rev 11/11/20.

## 2021-12-04 NOTE — PROGRESS NOTES
Patient presents with:  Cough: x8-9d, hx of asthma      Clinical Decision Making: Focused exam positive for rhinorrhea and cough, no wheezing, lungs clear. Blood pressure elevated out of atenolol, refilled and encouraged PCP follow up. COVID pending. Discussed symptomatic cares with plain mucinex, antihistamine, nasal saline spray, and albuterol inhaler.       ICD-10-CM    1. Viral URI with cough  J06.9 guaiFENesin (MUCINEX) 600 MG 12 hr tablet     cetirizine (ZYRTEC) 10 MG tablet     sodium chloride (OCEAN) 0.65 % nasal spray     albuterol (PROAIR HFA/PROVENTIL HFA/VENTOLIN HFA) 108 (90 Base) MCG/ACT inhaler   2. Primary hypertension  I10 atenolol (TENORMIN) 25 MG tablet   3. Mild intermittent asthma with exacerbation  J45.21 albuterol (PROAIR HFA/PROVENTIL HFA/VENTOLIN HFA) 108 (90 Base) MCG/ACT inhaler   4. Cough in adult patient  R05.9 Symptomatic COVID-19 Virus (Coronavirus) by PCR Nose       Patient Instructions       Patient Education     Viral Upper Respiratory Illness (Adult)    You have a viral upper respiratory illness (URI), which is another term for the common cold. This illness is contagious during the first few days. It is spread through the air by coughing and sneezing. It may also be spread by direct contact (touching the sick person and then touching your own eyes, nose, or mouth). Frequent handwashing will decrease risk of spread. Most viral illnesses go away within 7 to 10 days with rest and simple home remedies. Sometimes the illness may last for several weeks. Antibiotics will not kill a virus, and they are generally not prescribed for this condition.  Home care    If symptoms are severe, rest at home for the first 2 to 3 days. When you resume activity, don't let yourself get too tired.    Don't smoke. If you need help stopping, talk with your healthcare provider.    Avoid being exposed to cigarette smoke (yours or others ).    You may use acetaminophen or ibuprofen to control pain and fever,  unless another medicine was prescribed. If you have chronic liver or kidney disease, have ever had a stomach ulcer or gastrointestinal bleeding, or are taking blood-thinning medicines, talk with your healthcare provider before using these medicines. Aspirin should never be given to anyone under 18 years of age who is ill with a viral infection or fever. It may cause severe liver or brain damage.    Your appetite may be poor, so a light diet is fine. Stay well hydrated by drinking 6 to 8 glasses of fluids per day (water, soft drinks, juices, tea, or soup). Extra fluids will help loosen secretions in the nose and lungs.    Over-the-counter cold medicines will not shorten the length of time you re sick, but they may be helpful for the following symptoms: cough, sore throat, and nasal and sinus congestion. If you take prescription medicines, ask your healthcare provider or pharmacist which over-the-counter medicines are safe to use. (Note: Don't use decongestants if you have high blood pressure.)  Follow-up care  Follow up with your healthcare provider, or as advised.  When to seek medical advice  Call your healthcare provider right away if any of these occur:    Cough with lots of colored sputum (mucus)    Severe headache; face, neck, or ear pain    Difficulty swallowing due to throat pain    Fever of 100.4 F (38 C) or higher, or as directed by your healthcare provider  Call 911  Call 911 if any of these occur:    Chest pain, shortness of breath, wheezing, or difficulty breathing    Coughing up blood    Very severe pain with swallowing, especially if it goes along with a muffled voice   [employer] last reviewed this educational content on 6/1/2018 2000-2021 [employer]. All rights reserved. This information is not intended as a substitute for professional medical care. Always follow your healthcare professional's instructions.           Patient Education   After Your COVID-19 (Coronavirus) Test  You have  "been tested for COVID-19 (coronavirus).   If you'll have surgery in the next few days, we'll let you know ahead of time if you have the virus. Please call your surgeon's office with any questions.  For all other patients: Results are usually available in [facility]oopia within 2 to 3 days.   If you do not have a utoopia account, you'll get a letter in the mail in about 7 to 10 days.   BoostUphart is often the fastest way to get test results. Please sign up if you do not already have a utoopia account. See the handout Getting COVID-19 Test Results in utoopia for help.  What if my test result is positive?  If your test is positive and you have not viewed your result in utoopia, you'll get a phone call with your result. (A positive test means that you have the virus.)     Follow the tips under \"How do I self-isolate?\" below for 10 days (20 days if you have a weak immune system).    You don't need to be retested for COVID-19 before going back to school or work. As long as you're fever-free and feeling better, you can go back to school, work and other activities after waiting the 10 or 20 days.  What if I have questions after I get my results?  If you have questions about your results, please visit our testing website at [email or web address].   After 7 to 10 days, if you have not gotten your results:     Call [phone] ([phone]) and ask to speak with our COVID-19 results team.    If you're being treated at an infusion center: Call your infusion center directly.  What are the symptoms of COVID-19?  Cough, fever and trouble breathing are the most common signs of COVID-19.  Other symptoms can include new headaches, new muscle or body aches, new and unexplained fatigue (feeling very tired), chills, sore throat, congestion (stuffy or runny nose), diarrhea (loose poop), loss of taste or smell, belly pain, and nausea or vomiting (feeling sick to your stomach or throwing up).  You may already have " "symptoms of COVID-19, or they may show up later.  What should I do if I have symptoms?  If you're having surgery: Call your surgeon's office.  For all other patients: Stay home and away from others (self-isolate) until ...    You've had no fever--and no medicine that reduces fever--for 1 full day (24 hours), AND    Other symptoms have gotten better. For example, your cough or breathing has improved, AND    At least 10 days have passed since your symptoms first started.  How do I self-isolate?    Stay in your own room, even for meals. Use your own bathroom if you can.    Stay away from others in your home. No hugging, kissing or shaking hands. No visitors.    Don't go to work, school or anywhere else.    Clean \"high touch\" surfaces often (doorknobs, counters, handles). Use household cleaning spray or wipes. You'll find a full list of  on the EPA website: www.epa.gov/pesticide-registration/list-n-disinfectants-use-against-sars-cov-2.    Cover your mouth and nose with a mask or other face covering to avoid spreading germs.    Wash your hands and face often. Use soap and water.    Caregivers in these groups are at risk for severe illness due to COVID-19:  ? People 65 years and older  ? People who live in a nursing home or long-term care facility  ? People with chronic disease (lung, heart, cancer, diabetes, kidney, liver, immunologic)  ? People who have a weakened immune system, including those who:    Are in cancer treatment    Take medicine that weakens the immune system, such as corticosteroids    Had a bone marrow or organ transplant    Have an immune deficiency    Have poorly controlled HIV or AIDS    Are obese (body mass index of 40 or higher)    Smoke regularly    Caregivers should wear gloves while washing dishes, handling laundry and cleaning bedrooms and bathrooms.    Use caution when washing and drying laundry: Don't shake dirty laundry and use the warmest water setting that you can.    For more tips " on managing your health at home, go to www.cdc.gov/coronavirus/2019-ncov/downloads/10Things.pdf.  How can I take care of myself at home?  1. Get lots of rest. Drink extra fluids (unless a doctor has told you not to).  2. Take Tylenol (acetaminophen) for fever or pain. If you have liver or kidney problems, ask your family doctor if it's OK to take Tylenol.   Adults can take either:  ? 650 mg (two 325 mg pills) every 4 to 6 hours, or   ? 1,000 mg (two 500 mg pills) every 8 hours as needed.  ? Note: Don't take more than 3,000 mg in one day. Acetaminophen is found in many medicines (both prescribed and over-the-counter medicines). Read all labels to be sure you don't take too much.   For children, check the Tylenol bottle for the right dose. The dose is based on the child's age or weight.  3. If you have other health problems (like cancer, heart failure, an organ transplant or severe kidney disease): Call your specialty clinic if you don't feel better in the next 2 days.  4. Know when to call 911. Emergency warning signs include:  ? Trouble breathing or shortness of breath  ? Chest pain or pressure that doesn't go away  ? Feeling confused like you haven't felt before, or not being able to wake up  ? Bluish-colored lips or face  5. If your doctor prescribed a blood thinner medicine: Follow their instructions.  Where can I get more information?    [facility] - About COVID-19:   [email or web address]    CDC - If You're Sick: cdc.gov/coronavirus/2019-ncov/about/steps-when-sick.html    CDC - Ending Home Isolation: www.cdc.gov/coronavirus/2019-ncov/hcp/disposition-in-home-patients.html    CDC - Caring for Someone: www.cdc.gov/coronavirus/2019-ncov/if-you-are-sick/care-for-someone.html    [facility] - Interim Guidance for Hospital Discharge to Home: www.health.[facility].mn.us/diseases/coronavirus/hcp/hospdischarge.pdf    [facility] clinical trials (COVID-19 research studies):  clinicalaffairs.[facility]/n-clinical-trials    Below are the COVID-19 hotlines at the [place] Department of Health ([facility]). Interpreters are available.  ? For health questions: Call 302-935-5382 or [phone] (7 a.m. to 7 p.m.)  ? For questions about schools and childcare: Call 503-510-2019 or 1-871.478.3268 (7 a.m. to 7 p.m.)    For informational purposes only. Not to replace the advice of your health care provider. Clinically reviewed by Infection Prevention and the [facility] COVID-19 Clinical Team. Copyright    [employer]. All rights reserved. [employer] 093330 - Rev 20.           HPI: Russel James is a 40 year old female with history of asthma, HTN who presents today complaining of cough and cold symptoms for 8-9 days. Patient is unvaccinated for COVID. Family member with similar symptoms at home. Reports taking NyQuil OTC and Mucinex DM with some relief.     History obtained from the patient.  LMP: post hysterectomy       Problem List:  2020: Obesity (BMI 35.0-39.9) with comorbidity (H)  2020-10: Hypertensive disorder  2018: Controlled substance agreement signed 3/2019 ativan (anxiety/  panic) #10 tabs q 2 months   delivery delivered  Allergic Rhinitis  Abnormal Pap Smear Of Cervix  Major Depression, Recurrent  Fatigue  Dysmenorrhea  Asthma  Wheezing (Symptom)  Menorrhagia  Anxiety  Nipple Retraction      Past Medical History:   Diagnosis Date     Anxiety      Depression      Hypertension      Inverted nipple     since 7 years ago left breast      PONV (postoperative nausea and vomiting)      QT prolongation        Social History     Tobacco Use     Smoking status: Former Smoker     Smokeless tobacco: Never Used     Tobacco comment: quit in    Substance Use Topics     Alcohol use: Yes     Alcohol/week: 5.0 standard drinks       Review of Systems   Constitutional: Positive for activity change, appetite change and fatigue. Negative for chills and fever.    HENT: Positive for congestion, postnasal drip, rhinorrhea and sore throat.    Respiratory: Positive for cough, chest tightness, shortness of breath and wheezing.    Gastrointestinal: Negative for diarrhea and vomiting.   Genitourinary: Negative for dysuria.   Musculoskeletal: Negative for myalgias.   Neurological: Positive for headaches.       Vitals:    12/04/21 0932   BP: (!) 152/102   Pulse: 88   Resp: 16   Temp: 98.2  F (36.8  C)   TempSrc: Oral   SpO2: 97%   Weight: 89.2 kg (196 lb 9.6 oz)       Physical Exam  Constitutional:       General: She is not in acute distress.     Appearance: She is ill-appearing. She is not toxic-appearing or diaphoretic.   HENT:      Head: Normocephalic and atraumatic.      Right Ear: Tympanic membrane, ear canal and external ear normal.      Left Ear: Tympanic membrane, ear canal and external ear normal.      Nose: Congestion and rhinorrhea present.      Mouth/Throat:      Mouth: Mucous membranes are moist.      Pharynx: Posterior oropharyngeal erythema present. No oropharyngeal exudate.   Eyes:      General:         Right eye: No discharge.         Left eye: No discharge.      Extraocular Movements: Extraocular movements intact.      Conjunctiva/sclera: Conjunctivae normal.      Pupils: Pupils are equal, round, and reactive to light.   Cardiovascular:      Rate and Rhythm: Normal rate and regular rhythm.      Pulses: Normal pulses.      Heart sounds: Normal heart sounds.   Pulmonary:      Effort: Pulmonary effort is normal.      Breath sounds: Normal breath sounds. No wheezing.   Musculoskeletal:      Cervical back: Neck supple.   Lymphadenopathy:      Cervical: No cervical adenopathy.   Skin:     General: Skin is warm.      Capillary Refill: Capillary refill takes less than 2 seconds.   Neurological:      Mental Status: She is alert and oriented to person, place, and time.   Psychiatric:         Behavior: Behavior normal.         Labs:  No results found for any visits on  12/04/21.      At the end of the encounter, I discussed results, diagnosis, medications. Discussed red flags for immediate return to clinic/ER, as well as indications for follow up if no improvement. Patient understood and agreed to plan.     REYNALDO Craig CNP

## 2021-12-05 LAB — SARS-COV-2 RNA RESP QL NAA+PROBE: NEGATIVE

## 2021-12-06 ENCOUNTER — E-VISIT (OUTPATIENT)
Dept: FAMILY MEDICINE | Facility: CLINIC | Age: 41
End: 2021-12-06
Payer: COMMERCIAL

## 2021-12-06 DIAGNOSIS — J20.9 ACUTE BRONCHITIS WITH SYMPTOMS > 10 DAYS: Primary | ICD-10-CM

## 2021-12-06 PROCEDURE — 99421 OL DIG E/M SVC 5-10 MIN: CPT | Performed by: FAMILY MEDICINE

## 2021-12-06 RX ORDER — AZITHROMYCIN 250 MG/1
TABLET, FILM COATED ORAL
Qty: 6 TABLET | Refills: 0 | Status: SHIPPED | OUTPATIENT
Start: 2021-12-06 | End: 2021-12-11

## 2021-12-06 NOTE — PATIENT INSTRUCTIONS
"    Dear Russel James    After reviewing your responses, I've been able to diagnose you with \"Bronchitis\" which is a common infection of your lungs. While this is most commonly caused by a virus, the symptoms you have given suggest you should be treated with antibiotics.     I have sent ZPAK to your pharmacy to treat this infection.     It is important that you take all of your prescribed medication even if your symptoms are improving after a few doses. Taking all of your medicine helps prevent the symptoms from returning.     If your symptoms worsen, you develop chest pain or shortness of breath, fevers over 101, or are not improving in 5 days, please contact your primary care provider for an appointment or visit any of our convenient Walk-in Care or Urgent Care Centers to be seen which can be found on our website here.    Thanks again for choosing us as your health care partner,    Katya Rivas    "

## 2022-01-13 ENCOUNTER — MYC MEDICAL ADVICE (OUTPATIENT)
Dept: FAMILY MEDICINE | Facility: CLINIC | Age: 42
End: 2022-01-13
Payer: COMMERCIAL

## 2022-01-13 DIAGNOSIS — F41.9 ANXIETY: ICD-10-CM

## 2022-01-13 RX ORDER — LORAZEPAM 0.5 MG/1
TABLET ORAL
Qty: 10 TABLET | Refills: 0 | Status: SHIPPED | OUTPATIENT
Start: 2022-01-13 | End: 2023-01-05

## 2022-02-07 ENCOUNTER — APPOINTMENT (OUTPATIENT)
Dept: CT IMAGING | Facility: CLINIC | Age: 42
End: 2022-02-07
Payer: COMMERCIAL

## 2022-02-07 ENCOUNTER — HOSPITAL ENCOUNTER (EMERGENCY)
Facility: CLINIC | Age: 42
Discharge: HOME | End: 2022-02-07
Admitting: PHYSICIAN ASSISTANT
Payer: COMMERCIAL

## 2022-02-07 VITALS
RESPIRATION RATE: 18 BRPM | HEART RATE: 92 BPM | TEMPERATURE: 97.9 F | BODY MASS INDEX: 37.89 KG/M2 | SYSTOLIC BLOOD PRESSURE: 140 MMHG | OXYGEN SATURATION: 99 % | DIASTOLIC BLOOD PRESSURE: 91 MMHG | WEIGHT: 194 LBS

## 2022-02-07 DIAGNOSIS — K52.9 PROCTOCOLITIS: ICD-10-CM

## 2022-02-07 DIAGNOSIS — R10.9 ABDOMINAL DISCOMFORT: ICD-10-CM

## 2022-02-07 LAB
ALBUMIN SERPL-MCNC: 4.2 G/DL (ref 3.5–5)
ALBUMIN UR-MCNC: NEGATIVE MG/DL
ALP SERPL-CCNC: 37 U/L (ref 45–120)
ALT SERPL W P-5'-P-CCNC: 26 U/L (ref 0–45)
ANION GAP SERPL CALCULATED.3IONS-SCNC: 10 MMOL/L (ref 5–18)
APPEARANCE UR: CLEAR
AST SERPL W P-5'-P-CCNC: 21 U/L (ref 0–40)
BILIRUB DIRECT SERPL-MCNC: 0.2 MG/DL
BILIRUB SERPL-MCNC: 0.6 MG/DL (ref 0–1)
BILIRUB UR QL STRIP: NEGATIVE
BUN SERPL-MCNC: 8 MG/DL (ref 8–22)
C REACTIVE PROTEIN LHE: 1 MG/DL (ref 0–0.8)
CALCIUM SERPL-MCNC: 9.4 MG/DL (ref 8.5–10.5)
CHLORIDE BLD-SCNC: 104 MMOL/L (ref 98–107)
CO2 SERPL-SCNC: 24 MMOL/L (ref 22–31)
COLOR UR AUTO: ABNORMAL
CREAT SERPL-MCNC: 0.76 MG/DL (ref 0.6–1.1)
ERYTHROCYTE [DISTWIDTH] IN BLOOD BY AUTOMATED COUNT: 12.4 % (ref 10–15)
GFR SERPL CREATININE-BSD FRML MDRD: >90 ML/MIN/1.73M2
GLUCOSE BLD-MCNC: 93 MG/DL (ref 70–125)
GLUCOSE UR STRIP-MCNC: NEGATIVE MG/DL
HCT VFR BLD AUTO: 44.6 % (ref 35–47)
HGB BLD-MCNC: 14.4 G/DL (ref 11.7–15.7)
HGB UR QL STRIP: NEGATIVE
KETONES UR STRIP-MCNC: ABNORMAL MG/DL
LEUKOCYTE ESTERASE UR QL STRIP: NEGATIVE
LIPASE SERPL-CCNC: 19 U/L (ref 0–52)
MCH RBC QN AUTO: 30.6 PG (ref 26.5–33)
MCHC RBC AUTO-ENTMCNC: 32.3 G/DL (ref 31.5–36.5)
MCV RBC AUTO: 95 FL (ref 78–100)
NITRATE UR QL: NEGATIVE
PH UR STRIP: 7 [PH] (ref 5–7)
PLATELET # BLD AUTO: 258 10E3/UL (ref 150–450)
POTASSIUM BLD-SCNC: 4 MMOL/L (ref 3.5–5)
PROT SERPL-MCNC: 7.6 G/DL (ref 6–8)
RBC # BLD AUTO: 4.7 10E6/UL (ref 3.8–5.2)
SODIUM SERPL-SCNC: 138 MMOL/L (ref 136–145)
SP GR UR STRIP: 1.02 (ref 1–1.03)
UROBILINOGEN UR STRIP-MCNC: <2 MG/DL
WBC # BLD AUTO: 5.7 10E3/UL (ref 4–11)

## 2022-02-07 PROCEDURE — 81003 URINALYSIS AUTO W/O SCOPE: CPT | Performed by: PHYSICIAN ASSISTANT

## 2022-02-07 PROCEDURE — 86140 C-REACTIVE PROTEIN: CPT | Performed by: PHYSICIAN ASSISTANT

## 2022-02-07 PROCEDURE — 250N000011 HC RX IP 250 OP 636: Performed by: PHYSICIAN ASSISTANT

## 2022-02-07 PROCEDURE — 96374 THER/PROPH/DIAG INJ IV PUSH: CPT | Mod: 59

## 2022-02-07 PROCEDURE — 99285 EMERGENCY DEPT VISIT HI MDM: CPT | Mod: 25

## 2022-02-07 PROCEDURE — 85027 COMPLETE CBC AUTOMATED: CPT | Performed by: PHYSICIAN ASSISTANT

## 2022-02-07 PROCEDURE — 36415 COLL VENOUS BLD VENIPUNCTURE: CPT | Performed by: PHYSICIAN ASSISTANT

## 2022-02-07 PROCEDURE — 74177 CT ABD & PELVIS W/CONTRAST: CPT

## 2022-02-07 PROCEDURE — 82248 BILIRUBIN DIRECT: CPT | Performed by: PHYSICIAN ASSISTANT

## 2022-02-07 PROCEDURE — 80053 COMPREHEN METABOLIC PANEL: CPT | Performed by: PHYSICIAN ASSISTANT

## 2022-02-07 PROCEDURE — 83690 ASSAY OF LIPASE: CPT | Performed by: PHYSICIAN ASSISTANT

## 2022-02-07 RX ORDER — KETOROLAC TROMETHAMINE 15 MG/ML
15 INJECTION, SOLUTION INTRAMUSCULAR; INTRAVENOUS ONCE
Status: COMPLETED | OUTPATIENT
Start: 2022-02-07 | End: 2022-02-07

## 2022-02-07 RX ORDER — IOPAMIDOL 755 MG/ML
100 INJECTION, SOLUTION INTRAVASCULAR ONCE
Status: COMPLETED | OUTPATIENT
Start: 2022-02-07 | End: 2022-02-07

## 2022-02-07 RX ORDER — ONDANSETRON 4 MG/1
4 TABLET, ORALLY DISINTEGRATING ORAL EVERY 8 HOURS PRN
Qty: 10 TABLET | Refills: 0 | Status: SHIPPED | OUTPATIENT
Start: 2022-02-07 | End: 2022-02-10

## 2022-02-07 RX ORDER — OXYCODONE AND ACETAMINOPHEN 5; 325 MG/1; MG/1
1 TABLET ORAL EVERY 6 HOURS PRN
Qty: 8 TABLET | Refills: 0 | Status: SHIPPED | OUTPATIENT
Start: 2022-02-07 | End: 2022-02-10

## 2022-02-07 RX ADMIN — IOPAMIDOL 100 ML: 755 INJECTION, SOLUTION INTRAVENOUS at 14:35

## 2022-02-07 RX ADMIN — KETOROLAC TROMETHAMINE 15 MG: 15 INJECTION, SOLUTION INTRAMUSCULAR; INTRAVENOUS at 13:52

## 2022-02-07 ASSESSMENT — ENCOUNTER SYMPTOMS
CONSTIPATION: 0
BACK PAIN: 1
FEVER: 0
SHORTNESS OF BREATH: 0
DIARRHEA: 1
BLOOD IN STOOL: 0
VOMITING: 0
COUGH: 0
HEMATURIA: 0
FREQUENCY: 0
FLANK PAIN: 0
DYSURIA: 0
ABDOMINAL PAIN: 1
CHILLS: 0
NAUSEA: 0
DIAPHORESIS: 0

## 2022-02-07 NOTE — DISCHARGE INSTRUCTIONS
As we discussed, your CT showed evidence of some mild inflammation of your colon and rectum.  Please schedule close follow-up with [facility] for consideration of colonoscopy.  In the meantime, please stay well-hydrated, you may use Tylenol for discomfort or Percocet which is oxycodone with Tylenol.  Percocet is a strong pain medication and can get drowsy.  Please do not take this while working, driving, or operating heavy machinery.  If you start to experience diarrhea, please follow-up in your clinic for stool cultures.  If at anytime you develop a fever, black or bloody stools, worsening abdominal pain, or any new or concerning symptoms please return to the ER for further evaluation.

## 2022-02-07 NOTE — ED PROVIDER NOTES
Emergency Department Encounter   NAME: Russel James ; AGE: 41 year old female ; YOB: 1980 ; MRN: [id number] ; PCP: Shaina Adams   ED PROVIDER: Celeste Fields PA-C    Evaluation Date & Time:   2/7/2022 12:06 PM    CHIEF COMPLAINT:  Abdominal Pain      Impression and Plan   MDM: Russel James is a 41 year old female with a pertinent history of anxiety, depression, hypertension, who presents to the ED by self for evaluation of abdominal pain.  The patient has been experiencing generalized abdominal discomfort for the past 2 weeks, looser stools over the past 6 months, and some low back tightness.  Here in the ED, she is afebrile and vitally stable.  Nontoxic-appearing and in no acute distress.  Was initially hypertensive to 183/110.  She has generalized mild tenderness to the abdomen on exam though abdomen is soft with normal bowel sounds and no distention.  No rebound, guarding, or evidence of peritonitis.  She is status post hysterectomy.  She has no lower abdominal or pelvic tenderness.  No abnormal vaginal discharge, odor, or irritation.  She is  and in a committed relationship -no concern for STIs.  Very low suspicion for PID or TOA.  Considered torsion, however very unlikely given exam and presentation.  We discussed plan at this time for CT, blood work, urine to further assess.  Toradol ordered for initial discomfort.    UA without signs of infection.  No hematuria to suggest nephrolithiasis.  CT shows a mild amount of wall thickening of the sigmoid colon and rectum suggesting proctocolitis.  There is no evidence of acute appendicitis or diverticulitis.  No complications such as SBO, ileus, or perforation.  No evidence of abscess.  She has a very mild elevation in her CRP, however no leukocytosis and normal vitals.  No evidence of Sirs.  Discussed these results with the patient.  She has not had any recent travel outside of the country, fever, leukocytosis, or autumn diarrhea to  suggest a bacterial infectious colitis.  No antwon out of proportion to exam or concern for ischemic colitis. No black or bloody stools and blood counts normal - no concern for associated GI bleed. No recent antibiotic use besides a short course of Macrobid for UTI -no concern for sepsis at this time.  No concern for STIs or rectal penetration during intercourse.  Given the location of the mild inflammation, more suggestive of inflammatory bowel disease which we discussed.  Plan at this time is to refer patient to [facility] for further evaluation and likely colonoscopy to further evaluate.  Patient is comfortable with this plan.  I will send her home with Zofran and Percocet to use as needed for symptom control.  Medication side effects discussed.  We reviewed concerning signs and symptoms to return to the emergency department as well as follow-up in clinic.  She verbalized understanding is comfortable with the plan.  Discharged home in good condition.    Addend: we did discuss elevated BP. No signs of end organ dysfunction. Will have this rechecked in clinic.     ED COURSE:  12:34 PM I met and introduced myself to the patient. I gathered initial history and performed my physical exam. We discussed plan for initial workup.   2:50 PM Rechecked the patient and we discussed discharge, follow-up, medication, and reasons to return to the emergency department.    At the conclusion of the encounter I discussed the results of all the tests and the disposition. The questions were answered. The patient or family acknowledged understanding and was agreeable with the care plan.    FINAL IMPRESSION:    ICD-10-CM    1. Abdominal discomfort  R10.9    2. Proctocolitis  K52.9          MEDICATIONS GIVEN IN THE EMERGENCY DEPARTMENT:  Medications   ketorolac (TORADOL) injection 15 mg (15 mg Intravenous Given 2/7/22 1352)   iopamidol (ISOVUE-370) solution 100 mL (100 mLs Intravenous Given 2/7/22 1435)         NEW PRESCRIPTIONS STARTED  AT TODAY'S ED VISIT:  Discharge Medication List as of 2022  3:25 PM      START taking these medications    Details   ondansetron (ZOFRAN ODT) 4 MG ODT tab Take 1 tablet (4 mg) by mouth every 8 hours as needed for nausea, Disp-10 tablet, R-0, Local Print      oxyCODONE-acetaminophen (PERCOCET) 5-325 MG tablet Take 1 tablet by mouth every 6 hours as needed for pain, Disp-8 tablet, R-0, Local Print               HPI   Patient information was obtained from: Patient    Use of Intrepreter: N/A    Russel James is a 41 year old female with a pertinent history of anxiety, depression, hypertension, who presents to the ED by self for evaluation of abdominal pain.     Patient reports a constant uncomfortable sensation in her mid abdomen for the past 2 weeks.  Pain has been constant though waxes and wanes in severity, and has been worse over the past several days and especially this morning.  The pain radiates across her entire mid abdomen and she denies any focal area of increased pain.  She is also experiencing some lower back tightness, however does not believe that this is related to her abdominal pain.  She has had looser stools over the past 6 months, though no diarrhea, melanotic or bloody stools.  She has been having regular bowel movements over the past several days.  No recent fever, chills, nausea or vomiting.  She has had 2 recent UTIs, however is not currently experiencing any urinary symptoms.  She has had 2 previous  sections as well as a partial hysterectomy.  No vaginal discharge, odor, pelvic pain, or concern for STIs. She does have a history of GERD and has been using Prevacid with no relief.     REVIEW OF SYSTEMS:  Review of Systems   Constitutional: Negative for chills, diaphoresis and fever.   Respiratory: Negative for cough and shortness of breath.    Cardiovascular: Negative for chest pain.   Gastrointestinal: Positive for abdominal pain and diarrhea (looser stools). Negative for blood in  stool, constipation, nausea and vomiting.   Genitourinary: Negative.  Negative for dysuria, flank pain, frequency, hematuria, pelvic pain and vaginal discharge.   Musculoskeletal: Positive for back pain.   All other systems reviewed and are negative.        Medical History     Past Medical History:   Diagnosis Date     Anxiety      Depression      Hypertension      Inverted nipple      PONV (postoperative nausea and vomiting)      QT prolongation        Past Surgical History:   Procedure Laterality Date     CYSTOSCOPY N/A 2/10/2021    Procedure: CYSTOSCOPY;  Surgeon: Leonor Savage MD;  Location: [facility];  Service: Gynecology     HC DILATION/CURETTAGE DIAG/THER NON OB      Description: Dilation And Curettage;  Recorded: 2009;  Comments: elective AB     HYSTEROSCOPY, ABLATE ENDOMETRIUM HYDROTHERMAL, COMBINED N/A 2019    Procedure: HYSTEROSCOPY, KLAUDIA ENDOMETRIAL ABLATION;  Surgeon: Leonor Savage MD;  Location: [facility];  Service: Gynecology     LAPAROSCOPIC HYSTERECTOMY Bilateral 2/10/2021    Procedure: ROBOTIC LAPAROSCOPIC HYSTERECTOMY BILATERAL SALPINGECTOMY, LEFT LABIAL BIOPSY;  Surgeon: Leonor Savage MD;  Location: [facility];  Service: Gynecology     ZZC  DELIVERY ONLY      Description:  Section;  Recorded: 2009;     ZZC LIGATE FALLOPIAN TUBE      Description: Tubal Ligation;  Recorded: 2013;       Family History   Problem Relation Age of Onset     Heart Murmur Father      Heart Murmur Sister 35.00        late 30s       Social History     Tobacco Use     Smoking status: Former Smoker     Smokeless tobacco: Never Used     Tobacco comment: quit in    Substance Use Topics     Alcohol use: Yes     Alcohol/week: 5.0 standard drinks     Drug use: Never       ondansetron (ZOFRAN ODT) 4 MG ODT tab  oxyCODONE-acetaminophen (PERCOCET) 5-325 MG tablet  albuterol (PROAIR HFA/PROVENTIL HFA/VENTOLIN HFA) 108 (90 Base) MCG/ACT  inhaler  atenolol (TENORMIN) 25 MG tablet  busPIRone (BUSPAR) 15 MG tablet  cetirizine (ZYRTEC) 10 MG tablet  escitalopram oxalate (LEXAPRO) 10 MG tablet  guaiFENesin (COUGH SYRUP PO)  guaiFENesin (MUCINEX) 600 MG 12 hr tablet  LORazepam (ATIVAN) 0.5 MG tablet  sodium chloride (OCEAN) 0.65 % nasal spray          Physical Exam     First Vitals:  Patient Vitals for the past 24 hrs:   BP Temp Temp src Pulse Resp SpO2 Weight   02/07/22 1515 (!) 140/91 -- -- 92 -- 99 % --   02/07/22 1445 (!) 138/93 -- -- 81 -- 99 % --   02/07/22 1400 (!) 137/90 -- -- 78 -- 98 % --   02/07/22 1204 (!) 183/110 97.9  F (36.6  C) Oral 87 18 98 % 88 kg (194 lb)         PHYSICAL EXAM:   Physical Exam  Vitals and nursing note reviewed.   Constitutional:       General: She is not in acute distress.     Appearance: She is well-developed. She is not ill-appearing, toxic-appearing or diaphoretic.   HENT:      Head: Normocephalic.      Mouth/Throat:      Mouth: Mucous membranes are moist.   Cardiovascular:      Rate and Rhythm: Normal rate and regular rhythm.      Heart sounds: Normal heart sounds.   Pulmonary:      Effort: Pulmonary effort is normal.      Breath sounds: Normal breath sounds.   Abdominal:      General: Abdomen is flat. Bowel sounds are normal. There is no distension.      Palpations: Abdomen is soft.      Tenderness: There is abdominal tenderness (mild generalized). There is no right CVA tenderness, left CVA tenderness, guarding or rebound. Negative signs include Dunlap's sign, Rovsing's sign and McBurney's sign.      Hernia: No hernia is present.   Skin:     General: Skin is warm and dry.   Neurological:      Mental Status: She is alert.             Results     LAB:  All pertinent labs reviewed and interpreted  Labs Ordered and Resulted from Time of ED Arrival to Time of ED Departure   UA MACROSCOPIC WITH REFLEX TO MICRO AND CULTURE - Abnormal       Result Value    Color Urine Light Yellow      Appearance Urine Clear      Glucose  Urine Negative      Bilirubin Urine Negative      Ketones Urine Trace (*)     Specific Gravity Urine 1.016      Blood Urine Negative      pH Urine 7.0      Protein Albumin Urine Negative      Urobilinogen Urine <2.0      Nitrite Urine Negative      Leukocyte Esterase Urine Negative     HEPATIC FUNCTION PANEL - Abnormal    Bilirubin Total 0.6      Bilirubin Direct 0.2      Protein Total 7.6      Albumin 4.2      Alkaline Phosphatase 37 (*)     AST 21      ALT 26     CRP INFLAMMATION - Abnormal    CRP 1.0 (*)    CBC WITH PLATELETS - Normal    WBC Count 5.7      RBC Count 4.70      Hemoglobin 14.4      Hematocrit 44.6      MCV 95      MCH 30.6      MCHC 32.3      RDW 12.4      Platelet Count 258     BASIC METABOLIC PANEL - Normal    Sodium 138      Potassium 4.0      Chloride 104      Carbon Dioxide (CO2) 24      Anion Gap 10      Urea Nitrogen 8      Creatinine 0.76      Calcium 9.4      Glucose 93      GFR Estimate >90     LIPASE - Normal    Lipase 19         RADIOLOGY:  CT Abdomen Pelvis w Contrast   Final Result   IMPRESSION:    1.  Question of a mild amount of wall thickening of the sigmoid colon and rectum suggesting proctocolitis. Trace pelvic free fluid is either physiologic or reactive in nature.   2.  Hepatic steatosis.              Celeste Fields PA-C   Emergency Medicine   [facility] EMERGENCY ROOM       Celeste Fields PA-C  02/07/22 1856       Celeste Fields PA-C  02/07/22 1859       Celeste Fields PA-C  02/07/22 1902

## 2022-02-07 NOTE — ED TRIAGE NOTES
Patient here with diffuse abdominal pain for the past few weeks, hx of GERD, taking meds for GERD with no relief.  Katia Machado RN.......2/7/2022 12:04 PM

## 2022-02-11 ENCOUNTER — E-VISIT (OUTPATIENT)
Dept: FAMILY MEDICINE | Facility: CLINIC | Age: 42
End: 2022-02-11
Payer: COMMERCIAL

## 2022-02-11 DIAGNOSIS — R10.84 ABDOMINAL PAIN, GENERALIZED: Primary | ICD-10-CM

## 2022-02-11 PROCEDURE — 99421 OL DIG E/M SVC 5-10 MIN: CPT | Performed by: FAMILY MEDICINE

## 2022-02-11 RX ORDER — OXYCODONE HYDROCHLORIDE 5 MG/1
5 TABLET ORAL EVERY 6 HOURS PRN
Qty: 12 TABLET | Refills: 0 | Status: SHIPPED | OUTPATIENT
Start: 2022-02-11 | End: 2022-02-14

## 2022-02-11 NOTE — PATIENT INSTRUCTIONS
Thank you for choosing us for your care. I have placed an order for a prescription so that you can start treatment. View your full visit summary for details by clicking on the link below. Your pharmacist will able to address any questions you may have about the medication.     If you're not feeling better within 5-7 days, please schedule an appointment.  You can schedule an appointment right here in [facility], or call [phone]  If the visit is for the same symptoms as your eVisit, we'll refund the cost of your eVisit if seen within seven days.

## 2022-02-12 ENCOUNTER — TRANSFERRED RECORDS (OUTPATIENT)
Dept: HEALTH INFORMATION MANAGEMENT | Facility: CLINIC | Age: 42
End: 2022-02-12
Payer: COMMERCIAL

## 2022-02-17 ENCOUNTER — TRANSFERRED RECORDS (OUTPATIENT)
Dept: HEALTH INFORMATION MANAGEMENT | Facility: CLINIC | Age: 42
End: 2022-02-17
Payer: COMMERCIAL

## 2022-02-25 ENCOUNTER — TRANSFERRED RECORDS (OUTPATIENT)
Dept: HEALTH INFORMATION MANAGEMENT | Facility: CLINIC | Age: 42
End: 2022-02-25
Payer: COMMERCIAL

## 2022-07-18 ENCOUNTER — E-VISIT (OUTPATIENT)
Dept: FAMILY MEDICINE | Facility: CLINIC | Age: 42
End: 2022-07-18
Payer: COMMERCIAL

## 2022-07-18 DIAGNOSIS — J20.9 ACUTE BRONCHITIS WITH SYMPTOMS > 10 DAYS: Primary | ICD-10-CM

## 2022-07-18 DIAGNOSIS — J06.9 VIRAL URI WITH COUGH: ICD-10-CM

## 2022-07-18 DIAGNOSIS — J45.21 MILD INTERMITTENT ASTHMA WITH EXACERBATION: ICD-10-CM

## 2022-07-18 PROCEDURE — 99421 OL DIG E/M SVC 5-10 MIN: CPT | Performed by: FAMILY MEDICINE

## 2022-07-18 RX ORDER — AZITHROMYCIN 250 MG/1
TABLET, FILM COATED ORAL
Qty: 6 TABLET | Refills: 0 | Status: SHIPPED | OUTPATIENT
Start: 2022-07-18 | End: 2022-07-23

## 2022-07-18 RX ORDER — ALBUTEROL SULFATE 90 UG/1
1-2 AEROSOL, METERED RESPIRATORY (INHALATION) EVERY 4 HOURS PRN
Qty: 6.7 G | Refills: 0 | Status: SHIPPED | OUTPATIENT
Start: 2022-07-18

## 2022-07-18 RX ORDER — PREDNISONE 20 MG/1
20 TABLET ORAL 2 TIMES DAILY
Qty: 10 TABLET | Refills: 0 | Status: SHIPPED | OUTPATIENT
Start: 2022-07-18 | End: 2022-07-23

## 2022-07-18 NOTE — PATIENT INSTRUCTIONS
"    Dear Russel James    After reviewing your responses, I've been able to diagnose you with \"Bronchitis\" which is a common infection of your lungs. While this is most commonly caused by a virus, the symptoms you have given suggest you should be treated with antibiotics.     I have sent prednisone, albuterol, and Zpak to your pharmacy to treat this infection.     It is important that you take all of your prescribed medication even if your symptoms are improving after a few doses. Taking all of your medicine helps prevent the symptoms from returning.     If your symptoms worsen, you develop chest pain or shortness of breath, fevers over 101, or are not improving in 5 days, please contact your primary care provider for an appointment or visit any of our convenient Walk-in Care or Urgent Care Centers to be seen which can be found on our website here.    Thanks again for choosing us as your health care partner,    Katya Rivas    "

## 2022-07-23 ENCOUNTER — HEALTH MAINTENANCE LETTER (OUTPATIENT)
Age: 42
End: 2022-07-23

## 2022-10-01 ENCOUNTER — HEALTH MAINTENANCE LETTER (OUTPATIENT)
Age: 42
End: 2022-10-01

## 2023-01-05 ENCOUNTER — MYC REFILL (OUTPATIENT)
Dept: FAMILY MEDICINE | Facility: CLINIC | Age: 43
End: 2023-01-05

## 2023-01-05 DIAGNOSIS — F41.9 ANXIETY: ICD-10-CM

## 2023-01-09 RX ORDER — LORAZEPAM 0.5 MG/1
TABLET ORAL
Qty: 10 TABLET | Refills: 0 | Status: SHIPPED | OUTPATIENT
Start: 2023-01-09 | End: 2023-11-30

## 2023-01-26 ENCOUNTER — E-VISIT (OUTPATIENT)
Dept: FAMILY MEDICINE | Facility: CLINIC | Age: 43
End: 2023-01-26
Payer: COMMERCIAL

## 2023-01-26 DIAGNOSIS — N39.0 ACUTE UTI (URINARY TRACT INFECTION): Primary | ICD-10-CM

## 2023-01-26 PROCEDURE — 99421 OL DIG E/M SVC 5-10 MIN: CPT | Performed by: FAMILY MEDICINE

## 2023-01-27 ENCOUNTER — TRANSFERRED RECORDS (OUTPATIENT)
Dept: HEALTH INFORMATION MANAGEMENT | Facility: CLINIC | Age: 43
End: 2023-01-27

## 2023-01-27 RX ORDER — CIPROFLOXACIN 250 MG/1
250 TABLET, FILM COATED ORAL 2 TIMES DAILY
Qty: 6 TABLET | Refills: 0 | Status: SHIPPED | OUTPATIENT
Start: 2023-01-27 | End: 2023-01-30

## 2023-01-27 NOTE — PATIENT INSTRUCTIONS
Dear Russel James    After reviewing your responses, I've been able to diagnose you with a urinary tract infection, which is a common infection of the bladder with bacteria.  This is not a sexually transmitted infection, though urinating immediately after intercourse can help prevent infections.  Drinking lots of fluids is also helpful to clear your current infection and prevent the next one.      I have sent a prescription for antibiotics to your pharmacy to treat this infection.    It is important that you take all of your prescribed medication even if your symptoms are improving after a few doses.  Taking all of your medicine helps prevent the symptoms from returning.     If your symptoms worsen, you develop pain in your back or stomach, develop fevers, or are not improving in 5 days, please contact your primary care provider for an appointment or visit any of our convenient Walk-in or Urgent Care Centers to be seen, which can be found on our website here.    Thanks again for choosing us as your health care partner,    Katya Rivas MD

## 2023-05-02 ENCOUNTER — TRANSFERRED RECORDS (OUTPATIENT)
Dept: HEALTH INFORMATION MANAGEMENT | Facility: CLINIC | Age: 43
End: 2023-05-02
Payer: COMMERCIAL

## 2023-06-26 ENCOUNTER — E-VISIT (OUTPATIENT)
Dept: FAMILY MEDICINE | Facility: CLINIC | Age: 43
End: 2023-06-26

## 2023-06-26 DIAGNOSIS — N39.0 ACUTE UTI (URINARY TRACT INFECTION): Primary | ICD-10-CM

## 2023-06-26 PROCEDURE — 99421 OL DIG E/M SVC 5-10 MIN: CPT | Performed by: FAMILY MEDICINE

## 2023-06-27 ENCOUNTER — LAB (OUTPATIENT)
Dept: LAB | Facility: CLINIC | Age: 43
End: 2023-06-27

## 2023-06-27 DIAGNOSIS — N39.0 ACUTE UTI (URINARY TRACT INFECTION): ICD-10-CM

## 2023-06-27 LAB
ALBUMIN UR-MCNC: 30 MG/DL
APPEARANCE UR: ABNORMAL
BACTERIA #/AREA URNS HPF: ABNORMAL /HPF
BILIRUB UR QL STRIP: NEGATIVE
COLOR UR AUTO: YELLOW
GLUCOSE UR STRIP-MCNC: NEGATIVE MG/DL
HGB UR QL STRIP: ABNORMAL
KETONES UR STRIP-MCNC: NEGATIVE MG/DL
LEUKOCYTE ESTERASE UR QL STRIP: ABNORMAL
NITRATE UR QL: POSITIVE
PH UR STRIP: 7 [PH] (ref 5–8)
RBC #/AREA URNS AUTO: ABNORMAL /HPF
SP GR UR STRIP: 1.01 (ref 1–1.03)
SQUAMOUS #/AREA URNS AUTO: ABNORMAL /LPF
UROBILINOGEN UR STRIP-ACNC: 0.2 E.U./DL
WBC #/AREA URNS AUTO: ABNORMAL /HPF
WBC CLUMPS #/AREA URNS HPF: PRESENT /HPF

## 2023-06-27 PROCEDURE — 87086 URINE CULTURE/COLONY COUNT: CPT

## 2023-06-27 PROCEDURE — 87186 SC STD MICRODIL/AGAR DIL: CPT

## 2023-06-27 PROCEDURE — 81001 URINALYSIS AUTO W/SCOPE: CPT

## 2023-06-27 RX ORDER — NITROFURANTOIN 25; 75 MG/1; MG/1
100 CAPSULE ORAL 2 TIMES DAILY
Qty: 30 CAPSULE | Refills: 1 | Status: SHIPPED | OUTPATIENT
Start: 2023-06-27 | End: 2023-07-02

## 2023-06-28 LAB — BACTERIA UR CULT: ABNORMAL

## 2023-06-29 RX ORDER — FLUCONAZOLE 150 MG/1
150 TABLET ORAL ONCE
Qty: 2 TABLET | Refills: 0 | Status: SHIPPED | OUTPATIENT
Start: 2023-06-29 | End: 2023-06-29

## 2023-06-29 RX ORDER — ONDANSETRON 8 MG/1
8 TABLET, FILM COATED ORAL EVERY 8 HOURS PRN
Qty: 12 TABLET | Refills: 0 | Status: SHIPPED | OUTPATIENT
Start: 2023-06-29

## 2023-08-06 ENCOUNTER — HEALTH MAINTENANCE LETTER (OUTPATIENT)
Age: 43
End: 2023-08-06

## 2023-11-30 ENCOUNTER — MYC MEDICAL ADVICE (OUTPATIENT)
Dept: FAMILY MEDICINE | Facility: CLINIC | Age: 43
End: 2023-11-30
Payer: COMMERCIAL

## 2023-11-30 ENCOUNTER — MYC REFILL (OUTPATIENT)
Dept: FAMILY MEDICINE | Facility: CLINIC | Age: 43
End: 2023-11-30
Payer: COMMERCIAL

## 2023-11-30 DIAGNOSIS — N39.0 RECURRENT UTI: Primary | ICD-10-CM

## 2023-11-30 DIAGNOSIS — F41.9 ANXIETY: ICD-10-CM

## 2023-11-30 RX ORDER — NITROFURANTOIN 25; 75 MG/1; MG/1
CAPSULE ORAL
Qty: 30 CAPSULE | Refills: 3 | Status: SHIPPED | OUTPATIENT
Start: 2023-11-30

## 2023-11-30 RX ORDER — LORAZEPAM 0.5 MG/1
TABLET ORAL
Qty: 10 TABLET | Refills: 0 | Status: SHIPPED | OUTPATIENT
Start: 2023-11-30 | End: 2024-04-17

## 2024-03-03 ENCOUNTER — HEALTH MAINTENANCE LETTER (OUTPATIENT)
Age: 44
End: 2024-03-03

## 2024-04-17 ENCOUNTER — MYC MEDICAL ADVICE (OUTPATIENT)
Dept: FAMILY MEDICINE | Facility: CLINIC | Age: 44
End: 2024-04-17
Payer: COMMERCIAL

## 2024-04-17 DIAGNOSIS — F41.9 ANXIETY: ICD-10-CM

## 2024-04-17 RX ORDER — LORAZEPAM 0.5 MG/1
TABLET ORAL
Qty: 10 TABLET | Refills: 0 | Status: SHIPPED | OUTPATIENT
Start: 2024-04-17 | End: 2024-07-19

## 2024-05-10 NOTE — TELEPHONE ENCOUNTER
Controlled Substance Refill Request  Medication:   Requested Prescriptions     Pending Prescriptions Disp Refills     LORazepam (ATIVAN) 0.5 MG tablet 10 tablet 0     Si tab up to twice daily for anxiety     Date Last Fill: 3/11/19  Pharmacy: [facility]   Submit electronically to pharmacy    Controlled Substance Agreement on File:   Encounter-Level CSA Scan Date - 10/17/2017:    Scan on 10/25/2017  2:31 PM (below)             Encounter-Level CSA Scan Date - 10/31/2016:    Scan on 10/31/2016 (below)         Last office visit with primary: 3/11/2019     Called patient was coughing and passed out for few seconds.  Will Order an EKG Patient ,Had Just Returned from chest x-ray patient was not on the monitor will place him on a continuous cardiac monitor. pt AO3 coughing. no cp Heart rate 97 sinus with an isolated T wave inversion in lead III QT/QTc 354/447 MD Olmos: Labs unactionable. Pt was re-evaluated at bedside, VSS, cough persisting, but feeling better overall. Results were discussed with patient. Patient offered CDU and Tele admission, however patient would like to go home. States he will make appointment with his private cardiologist on Monday. Discussed return precautions and follow up plan with PCP and cardiologist. Time was taken to answer any questions that the patient had before providing them with discharge paperwork. none Called patient was coughing and passed out for few seconds.  Will Order an EKG Patient ,Had Just Returned from chest x-ray patient was not on the monitor will place him on a continuous cardiac monitor. pt AO3 coughing. no cp.

## 2024-07-18 ENCOUNTER — MYC MEDICAL ADVICE (OUTPATIENT)
Dept: FAMILY MEDICINE | Facility: CLINIC | Age: 44
End: 2024-07-18
Payer: COMMERCIAL

## 2024-07-18 DIAGNOSIS — F41.9 ANXIETY: ICD-10-CM

## 2024-07-18 DIAGNOSIS — Z12.31 ENCOUNTER FOR SCREENING MAMMOGRAM FOR BREAST CANCER: Primary | ICD-10-CM

## 2024-07-18 NOTE — TELEPHONE ENCOUNTER
If you place a mammogram order we can provide the CPT code to the patient so she can call billing to estimate daly.    Fidel Beckwith RN     [facility]

## 2024-07-19 RX ORDER — LORAZEPAM 0.5 MG/1
TABLET ORAL
Qty: 10 TABLET | Refills: 0 | Status: SHIPPED | OUTPATIENT
Start: 2024-07-19 | End: 2024-07-30

## 2024-09-29 ENCOUNTER — HEALTH MAINTENANCE LETTER (OUTPATIENT)
Age: 44
End: 2024-09-29

## 2024-11-13 ENCOUNTER — ANCILLARY PROCEDURE (OUTPATIENT)
Dept: MAMMOGRAPHY | Facility: CLINIC | Age: 44
End: 2024-11-13
Attending: FAMILY MEDICINE

## 2024-11-13 DIAGNOSIS — Z12.31 ENCOUNTER FOR SCREENING MAMMOGRAM FOR BREAST CANCER: ICD-10-CM

## 2024-11-13 PROCEDURE — 77063 BREAST TOMOSYNTHESIS BI: CPT

## 2024-12-15 DIAGNOSIS — N39.0 RECURRENT UTI: ICD-10-CM

## 2024-12-15 RX ORDER — NITROFURANTOIN 25; 75 MG/1; MG/1
CAPSULE ORAL
Qty: 30 CAPSULE | Refills: 3 | Status: SHIPPED | OUTPATIENT
Start: 2024-12-15

## 2025-01-09 ENCOUNTER — MYC MEDICAL ADVICE (OUTPATIENT)
Dept: FAMILY MEDICINE | Facility: CLINIC | Age: 45
End: 2025-01-09

## 2025-01-09 DIAGNOSIS — F41.9 ANXIETY: ICD-10-CM

## 2025-01-09 RX ORDER — LORAZEPAM 0.5 MG/1
TABLET ORAL
Qty: 30 TABLET | Refills: 1 | Status: SHIPPED | OUTPATIENT
Start: 2025-01-09